# Patient Record
Sex: FEMALE | Race: WHITE | Employment: OTHER | URBAN - METROPOLITAN AREA
[De-identification: names, ages, dates, MRNs, and addresses within clinical notes are randomized per-mention and may not be internally consistent; named-entity substitution may affect disease eponyms.]

---

## 2017-11-14 PROCEDURE — 81001 URINALYSIS AUTO W/SCOPE: CPT | Performed by: FAMILY MEDICINE

## 2018-04-09 PROBLEM — M81.0 OSTEOPOROSIS OF MULTIPLE SITES: Status: ACTIVE | Noted: 2018-04-09

## 2018-04-09 PROBLEM — N39.41 URGENCY INCONTINENCE: Status: ACTIVE | Noted: 2018-04-09

## 2018-04-09 PROBLEM — N95.2 POSTMENOPAUSAL ATROPHIC VAGINITIS: Status: ACTIVE | Noted: 2018-04-09

## 2018-04-26 PROCEDURE — 87086 URINE CULTURE/COLONY COUNT: CPT | Performed by: FAMILY MEDICINE

## 2018-04-26 PROCEDURE — 81001 URINALYSIS AUTO W/SCOPE: CPT | Performed by: FAMILY MEDICINE

## 2018-05-10 PROCEDURE — 83876 ASSAY MYELOPEROXIDASE: CPT | Performed by: INTERNAL MEDICINE

## 2018-05-10 PROCEDURE — 86038 ANTINUCLEAR ANTIBODIES: CPT | Performed by: INTERNAL MEDICINE

## 2018-05-10 PROCEDURE — 86235 NUCLEAR ANTIGEN ANTIBODY: CPT | Performed by: INTERNAL MEDICINE

## 2018-05-10 PROCEDURE — 82085 ASSAY OF ALDOLASE: CPT | Performed by: INTERNAL MEDICINE

## 2018-05-10 PROCEDURE — 86225 DNA ANTIBODY NATIVE: CPT | Performed by: INTERNAL MEDICINE

## 2018-05-10 PROCEDURE — 83516 IMMUNOASSAY NONANTIBODY: CPT | Performed by: INTERNAL MEDICINE

## 2018-05-10 PROCEDURE — 86255 FLUORESCENT ANTIBODY SCREEN: CPT | Performed by: INTERNAL MEDICINE

## 2018-05-22 ENCOUNTER — OFFICE VISIT (OUTPATIENT)
Dept: INTERNAL MEDICINE CLINIC | Facility: CLINIC | Age: 78
End: 2018-05-22

## 2018-05-22 VITALS
BODY MASS INDEX: 27.68 KG/M2 | SYSTOLIC BLOOD PRESSURE: 122 MMHG | WEIGHT: 141 LBS | HEIGHT: 60 IN | DIASTOLIC BLOOD PRESSURE: 71 MMHG | HEART RATE: 89 BPM

## 2018-05-22 DIAGNOSIS — M89.8X9 BONE PAIN: ICD-10-CM

## 2018-05-22 DIAGNOSIS — M94.0 COSTOCHONDRITIS: ICD-10-CM

## 2018-05-22 DIAGNOSIS — R53.82 CHRONIC FATIGUE: Primary | ICD-10-CM

## 2018-05-22 PROCEDURE — G0463 HOSPITAL OUTPT CLINIC VISIT: HCPCS | Performed by: INTERNAL MEDICINE

## 2018-05-22 PROCEDURE — 99204 OFFICE O/P NEW MOD 45 MIN: CPT | Performed by: INTERNAL MEDICINE

## 2018-05-22 NOTE — PROGRESS NOTES
HPI:    Patient ID: Magdaleno Collins is a 76year old female. HPI  She is here today to establish care   She also wants second  Opinion regarding of  her rib pain on the left side that is ongoing since she had her cough started. She denies any trauma.   Acc Musculoskeletal: Negative for arthralgias, back pain, gait problem, joint swelling, myalgias, neck pain and neck stiffness. Pain on chest wall/rib   Allergic/Immunologic: Negative.     Neurological: Negative for dizziness, tremors, speech difficulty, 2010: OTHER SURGICAL HISTORY      Comment: Coronary angiogram   Family History   Problem Relation Age of Onset   • Stroke Father    • Cancer Mother 80     ovarian      Social History: Smoking status: Former Smoker Pulmonary/Chest: Effort normal and breath sounds normal. No accessory muscle usage. No respiratory distress. She has no wheezes. She has no rales. She exhibits no tenderness. Abdominal: Soft.  Bowel sounds are normal. She exhibits no shifting dullness, no

## 2018-05-22 NOTE — PATIENT INSTRUCTIONS
Chronic fatigue  (primary encounter diagnosis)will check her thyroid, b12 level      rib pain /Costochondritis xr done in April no acute pathology - try ibuprofen as needed, will check inflamatory marker , if continues will need further workup   ILD/ IPF

## 2018-06-01 PROCEDURE — 86225 DNA ANTIBODY NATIVE: CPT | Performed by: INTERNAL MEDICINE

## 2018-06-01 PROCEDURE — 36415 COLL VENOUS BLD VENIPUNCTURE: CPT | Performed by: INTERNAL MEDICINE

## 2018-06-01 PROCEDURE — 86038 ANTINUCLEAR ANTIBODIES: CPT | Performed by: INTERNAL MEDICINE

## 2018-06-01 PROCEDURE — 83516 IMMUNOASSAY NONANTIBODY: CPT | Performed by: INTERNAL MEDICINE

## 2018-06-07 ENCOUNTER — TELEPHONE (OUTPATIENT)
Dept: INTERNAL MEDICINE CLINIC | Facility: CLINIC | Age: 78
End: 2018-06-07

## 2018-06-07 NOTE — TELEPHONE ENCOUNTER
patient saw a pulmonary doctor was told she need to see rheumatology for fibro myalgia who do you recommend

## 2018-06-22 ENCOUNTER — TELEPHONE (OUTPATIENT)
Dept: INTERNAL MEDICINE CLINIC | Facility: CLINIC | Age: 78
End: 2018-06-22

## 2018-06-22 NOTE — TELEPHONE ENCOUNTER
Patient calling to let you know she is seeing pulmonary doctor on the 27th at [facility] and will schedule appointment after that.  MICHAELA

## 2018-06-29 ENCOUNTER — OFFICE VISIT (OUTPATIENT)
Dept: INTERNAL MEDICINE CLINIC | Facility: CLINIC | Age: 78
End: 2018-06-29

## 2018-06-29 VITALS
BODY MASS INDEX: 26.24 KG/M2 | HEIGHT: 61 IN | TEMPERATURE: 99 F | DIASTOLIC BLOOD PRESSURE: 80 MMHG | RESPIRATION RATE: 18 BRPM | HEART RATE: 78 BPM | WEIGHT: 139 LBS | SYSTOLIC BLOOD PRESSURE: 135 MMHG

## 2018-06-29 DIAGNOSIS — R07.81 RIB PAIN: Primary | ICD-10-CM

## 2018-06-29 DIAGNOSIS — R76.8 POSITIVE ANA (ANTINUCLEAR ANTIBODY): ICD-10-CM

## 2018-06-29 PROCEDURE — G0463 HOSPITAL OUTPT CLINIC VISIT: HCPCS | Performed by: INTERNAL MEDICINE

## 2018-06-29 PROCEDURE — 99213 OFFICE O/P EST LOW 20 MIN: CPT | Performed by: INTERNAL MEDICINE

## 2018-06-29 RX ORDER — PANTOPRAZOLE SODIUM 40 MG/1
40 TABLET, DELAYED RELEASE ORAL
COMMUNITY
End: 2019-05-06

## 2018-06-29 NOTE — PATIENT INSTRUCTIONS
ib pain  (primary encounter diagnosis) etiology unclear- her recent ct chest didn't show anything, ibuprofen is helping but she doesn't want to take because of her stomach     Positive forrest (antinuclear antibody) - rest of the antibodies  Are negative, + ri

## 2018-06-29 NOTE — PROGRESS NOTES
HPI:    Patient ID: Lit Weber is a 76year old female. HPI      She is here today on her rib pain and follow up on her pulmonary fibrosis    She  states that she  was seen at [facility] for second opinion and they recommended to start new medication.   Sh 267 MG Oral Cap Take 1 tab 3x daily for 1 week, 2 tabs 3x daily for 1 week, 3 tabs 3x daily thereafter Disp:  Rfl:    Pantoprazole Sodium 40 MG Oral Tab EC Take 1 tablet (40 mg total) by mouth daily.  Before meal Disp: 30 tablet Rfl: 11   S-Adenosylmethioni 1/1/1989  Smokeless tobacco: Never Used                      Alcohol use: Yes           0.0 oz/week     Comment: occas       PHYSICAL EXAM:    Physical Exam   Constitutional: She is oriented to person, place, and time.  She appears well-developed and well-n exhibits no edema or tenderness. Lymphadenopathy:     She has no cervical adenopathy. She has no axillary adenopathy. Neurological: She is alert and oriented to person, place, and time. She has normal reflexes. No cranial nerve deficit.  She exhibit

## 2018-11-21 PROBLEM — Z00.00 MEDICARE ANNUAL WELLNESS VISIT, SUBSEQUENT: Status: ACTIVE | Noted: 2018-11-21

## 2018-11-21 PROBLEM — J84.112 IDIOPATHIC PULMONARY FIBROSIS (HCC): Status: ACTIVE | Noted: 2018-11-21

## 2018-11-23 PROCEDURE — 87086 URINE CULTURE/COLONY COUNT: CPT | Performed by: FAMILY MEDICINE

## 2018-11-23 PROCEDURE — 81001 URINALYSIS AUTO W/SCOPE: CPT | Performed by: FAMILY MEDICINE

## 2019-01-24 PROBLEM — IMO0002 DISORDER OF ROTATOR CUFF SYNDROME OF RIGHT SHOULDER AND ALLIED DISORDER: Status: ACTIVE | Noted: 2019-01-24

## 2019-02-28 PROBLEM — M75.121 COMPLETE TEAR OF RIGHT ROTATOR CUFF: Status: ACTIVE | Noted: 2019-02-28

## 2019-05-06 PROBLEM — Z00.00 MEDICARE ANNUAL WELLNESS VISIT, SUBSEQUENT: Status: RESOLVED | Noted: 2018-11-21 | Resolved: 2019-05-06

## 2019-05-06 PROBLEM — M81.0 OSTEOPOROSIS OF MULTIPLE SITES: Status: RESOLVED | Noted: 2018-04-09 | Resolved: 2019-05-06

## 2019-05-06 PROBLEM — IMO0002 DISORDER OF ROTATOR CUFF SYNDROME OF RIGHT SHOULDER AND ALLIED DISORDER: Status: RESOLVED | Noted: 2019-01-24 | Resolved: 2019-05-06

## 2019-09-21 ENCOUNTER — APPOINTMENT (OUTPATIENT)
Dept: CT IMAGING | Facility: HOSPITAL | Age: 79
End: 2019-09-21
Attending: EMERGENCY MEDICINE
Payer: MEDICARE

## 2019-09-21 ENCOUNTER — APPOINTMENT (OUTPATIENT)
Dept: GENERAL RADIOLOGY | Facility: HOSPITAL | Age: 79
End: 2019-09-21
Attending: EMERGENCY MEDICINE
Payer: MEDICARE

## 2019-09-21 ENCOUNTER — HOSPITAL ENCOUNTER (OUTPATIENT)
Age: 79
Discharge: ANOTHER HOSPITAL | End: 2019-09-21
Attending: EMERGENCY MEDICINE

## 2019-09-21 ENCOUNTER — HOSPITAL ENCOUNTER (EMERGENCY)
Facility: HOSPITAL | Age: 79
Discharge: HOME | End: 2019-09-21
Attending: EMERGENCY MEDICINE
Payer: MEDICARE

## 2019-09-21 VITALS
SYSTOLIC BLOOD PRESSURE: 162 MMHG | RESPIRATION RATE: 18 BRPM | WEIGHT: 135 LBS | HEART RATE: 79 BPM | OXYGEN SATURATION: 100 % | BODY MASS INDEX: 26.5 KG/M2 | HEIGHT: 60 IN | TEMPERATURE: 97 F | DIASTOLIC BLOOD PRESSURE: 75 MMHG

## 2019-09-21 VITALS
HEART RATE: 78 BPM | TEMPERATURE: 98 F | RESPIRATION RATE: 16 BRPM | HEIGHT: 60 IN | WEIGHT: 135 LBS | OXYGEN SATURATION: 98 % | BODY MASS INDEX: 26.5 KG/M2 | DIASTOLIC BLOOD PRESSURE: 77 MMHG | SYSTOLIC BLOOD PRESSURE: 158 MMHG
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
## 2019-09-21 DIAGNOSIS — T14.90XA BLUNT TRAUMA: Primary | ICD-10-CM

## 2019-09-21 DIAGNOSIS — S20.219A CONTUSION OF CHEST WALL, UNSPECIFIED LATERALITY, INITIAL ENCOUNTER: ICD-10-CM

## 2019-09-21 LAB
ANION GAP SERPL CALC-SCNC: 8 MMOL/L (ref 0–18)
BASOPHILS # BLD AUTO: 0.03 X10(3) UL (ref 0–0.2)
BASOPHILS NFR BLD AUTO: 0.4 %
BUN BLD-MCNC: 17 MG/DL (ref 7–18)
BUN/CREAT SERPL: 21.5 (ref 10–20)
CALCIUM BLD-MCNC: 9.4 MG/DL (ref 8.5–10.1)
CHLORIDE SERPL-SCNC: 108 MMOL/L (ref 98–112)
CO2 SERPL-SCNC: 23 MMOL/L (ref 21–32)
CREAT BLD-MCNC: 0.79 MG/DL (ref 0.55–1.02)
DEPRECATED RDW RBC AUTO: 41.7 FL (ref 35.1–46.3)
EOSINOPHIL # BLD AUTO: 0.15 X10(3) UL (ref 0–0.7)
EOSINOPHIL NFR BLD AUTO: 1.9 %
ERYTHROCYTE [DISTWIDTH] IN BLOOD BY AUTOMATED COUNT: 12.7 % (ref 11–15)
GLUCOSE BLD-MCNC: 96 MG/DL (ref 70–99)
HCT VFR BLD AUTO: 38.3 % (ref 35–48)
HGB BLD-MCNC: 13 G/DL (ref 12–16)
IMM GRANULOCYTES # BLD AUTO: 0.02 X10(3) UL (ref 0–1)
IMM GRANULOCYTES NFR BLD: 0.3 %
LYMPHOCYTES # BLD AUTO: 1.84 X10(3) UL (ref 1–4)
LYMPHOCYTES NFR BLD AUTO: 23.7 %
MCH RBC QN AUTO: 31 PG (ref 26–34)
MCHC RBC AUTO-ENTMCNC: 33.9 G/DL (ref 31–37)
MCV RBC AUTO: 91.2 FL (ref 80–100)
MONOCYTES # BLD AUTO: 0.51 X10(3) UL (ref 0.1–1)
MONOCYTES NFR BLD AUTO: 6.6 %
NEUTROPHILS # BLD AUTO: 5.2 X10 (3) UL (ref 1.5–7.7)
NEUTROPHILS # BLD AUTO: 5.2 X10(3) UL (ref 1.5–7.7)
NEUTROPHILS NFR BLD AUTO: 67.1 %
OSMOLALITY SERPL CALC.SUM OF ELEC: 289 MOSM/KG (ref 275–295)
PLATELET # BLD AUTO: 164 10(3)UL (ref 150–450)
POTASSIUM SERPL-SCNC: 4.6 MMOL/L (ref 3.5–5.1)
RBC # BLD AUTO: 4.2 X10(6)UL (ref 3.8–5.3)
SODIUM SERPL-SCNC: 139 MMOL/L (ref 136–145)
WBC # BLD AUTO: 7.8 X10(3) UL (ref 4–11)

## 2019-09-21 PROCEDURE — 71260 CT THORAX DX C+: CPT | Performed by: EMERGENCY MEDICINE

## 2019-09-21 PROCEDURE — 99203 OFFICE O/P NEW LOW 30 MIN: CPT

## 2019-09-21 PROCEDURE — 70450 CT HEAD/BRAIN W/O DYE: CPT | Performed by: EMERGENCY MEDICINE

## 2019-09-21 PROCEDURE — 73140 X-RAY EXAM OF FINGER(S): CPT | Performed by: EMERGENCY MEDICINE

## 2019-09-21 PROCEDURE — 80048 BASIC METABOLIC PNL TOTAL CA: CPT | Performed by: EMERGENCY MEDICINE

## 2019-09-21 PROCEDURE — 74177 CT ABD & PELVIS W/CONTRAST: CPT | Performed by: EMERGENCY MEDICINE

## 2019-09-21 PROCEDURE — 99284 EMERGENCY DEPT VISIT MOD MDM: CPT

## 2019-09-21 PROCEDURE — 36415 COLL VENOUS BLD VENIPUNCTURE: CPT

## 2019-09-21 PROCEDURE — 85025 COMPLETE CBC W/AUTO DIFF WBC: CPT | Performed by: EMERGENCY MEDICINE

## 2019-09-21 PROCEDURE — 99202 OFFICE O/P NEW SF 15 MIN: CPT

## 2019-09-21 NOTE — ED PROVIDER NOTES
Patient Seen in: [facility] Emergency Department      History   Patient presents with:  Fall (musculoskeletal, neurologic)    Stated Complaint: fall    HPI    68-year-old female with history of CAD on aspirin, pulmonary fibrosis presents for evalu Comment: occas    Drug use: No             Review of Systems    Positive for stated complaint: fall  Other systems are as noted in HPI. Constitutional and vital signs reviewed. All other systems reviewed and negative except as noted above.     Physica WITH PLATELET    Narrative: The following orders were created for panel order CBC WITH DIFFERENTIAL WITH PLATELET.   Procedure                               Abnormality         Status                     ---------                               --------- atrophy with nonspecific white matter changes most likely     related to chronic microvascular ischemia.            Dictated by (CST): Conrado Coleman MD on 9/21/2019 at 11:45         Approved by (CST): Conrado Coleman MD on 9/21/2019 at 11:48 PANCREAS: No fluid collection or ductal dilatation. ADRENALS: No mass or enlargement. KIDNEYS: No hydronephrosis. Symmetric enhancement. Low-density probable     cysts in the right kidney. GI/MESENTERY: Small hiatal hernia. No obstruction. (CST): Seda Hoang MD on 9/21/2019 at 10:50               XR FINGER(S) (MIN 2 VIEWS), LEFT THUMB (CPT=73140)   Final Result    PROCEDURE: XR FINGER(S) (MIN 2 VIEWS), LEFT THUMB (CPT=73140)         COMPARISON: None.          INDICATIONS: Post fal Normal    Consults: No orders of the defined types were placed in this encounter.       MD Discussions or Sign-Outs: None    Impression:  After review and interpretation of the above emergency department workup, the patient was found to have Fall, initial e

## 2019-09-21 NOTE — ED NOTES
Pt presents post mechanical fall yesterday at 1400. States tripped on curb. Endorses pain in L wrist, L knee, bilateral rib pain under breasts, bridge of nose also impacted d/t glasses. On exam, abrasion to L knee, L hand noted.  Pain on palpation to L/R lo

## 2019-09-21 NOTE — ED INITIAL ASSESSMENT (HPI)
Pt reports mechanical fall yesterday while getting into the bus on a bus trip. States tripped over uneven curb. Reports falling on open hands, c/o pain to left hand and wrist, bilateral ribs, and bridge of nose and left knee. Pt denies LOC.  Pt reports 81mg

## 2019-09-21 NOTE — ED NOTES
Pt up ambulating in room without difficulty. Pt took a tylenol (500mg) from her own supply for continued c/o pain. States her lower rib cage hurts when she coughs or moves.   Respiratory therapist at bedside to instruct pt on how to use incentive spiromet

## 2019-11-25 PROBLEM — N39.41 URGENCY INCONTINENCE: Status: RESOLVED | Noted: 2018-04-09 | Resolved: 2019-11-25

## 2019-11-25 PROBLEM — N95.2 POSTMENOPAUSAL ATROPHIC VAGINITIS: Status: RESOLVED | Noted: 2018-04-09 | Resolved: 2019-11-25

## 2019-11-25 PROBLEM — K21.9 GASTROESOPHAGEAL REFLUX DISEASE WITHOUT ESOPHAGITIS: Status: ACTIVE | Noted: 2019-11-25

## 2020-08-13 ENCOUNTER — HOSPITAL ENCOUNTER (OUTPATIENT)
Facility: CLINIC | Age: 80
Setting detail: OBSERVATION
Discharge: HOME | End: 2020-08-14
Attending: EMERGENCY MEDICINE | Admitting: INTERNAL MEDICINE
Payer: COMMERCIAL

## 2020-08-13 ENCOUNTER — APPOINTMENT (OUTPATIENT)
Dept: CT IMAGING | Facility: CLINIC | Age: 80
End: 2020-08-13
Attending: EMERGENCY MEDICINE
Payer: COMMERCIAL

## 2020-08-13 DIAGNOSIS — K40.30 NON-RECURRENT INGUINAL HERNIA WITH OBSTRUCTION WITHOUT GANGRENE, UNSPECIFIED LATERALITY: ICD-10-CM

## 2020-08-13 LAB
ANION GAP SERPL CALCULATED.3IONS-SCNC: 6 MMOL/L (ref 3–14)
BASOPHILS # BLD AUTO: 0 10E9/L (ref 0–0.2)
BASOPHILS NFR BLD AUTO: 0.3 %
BUN SERPL-MCNC: 9 MG/DL (ref 7–30)
CALCIUM SERPL-MCNC: 8.3 MG/DL (ref 8.5–10.1)
CHLORIDE SERPL-SCNC: 105 MMOL/L (ref 94–109)
CO2 SERPL-SCNC: 26 MMOL/L (ref 20–32)
CREAT SERPL-MCNC: 0.61 MG/DL (ref 0.52–1.04)
DIFFERENTIAL METHOD BLD: ABNORMAL
EOSINOPHIL # BLD AUTO: 0.1 10E9/L (ref 0–0.7)
EOSINOPHIL NFR BLD AUTO: 1.4 %
ERYTHROCYTE [DISTWIDTH] IN BLOOD BY AUTOMATED COUNT: 16.7 % (ref 10–15)
GFR SERPL CREATININE-BSD FRML MDRD: 84 ML/MIN/{1.73_M2}
GLUCOSE SERPL-MCNC: 99 MG/DL (ref 70–99)
HCT VFR BLD AUTO: 37.9 % (ref 35–47)
HGB BLD-MCNC: 12.1 G/DL (ref 11.7–15.7)
IMM GRANULOCYTES # BLD: 0 10E9/L (ref 0–0.4)
IMM GRANULOCYTES NFR BLD: 0.1 %
LYMPHOCYTES # BLD AUTO: 1.8 10E9/L (ref 0.8–5.3)
LYMPHOCYTES NFR BLD AUTO: 24.3 %
MCH RBC QN AUTO: 29.7 PG (ref 26.5–33)
MCHC RBC AUTO-ENTMCNC: 31.9 G/DL (ref 31.5–36.5)
MCV RBC AUTO: 93 FL (ref 78–100)
MONOCYTES # BLD AUTO: 0.5 10E9/L (ref 0–1.3)
MONOCYTES NFR BLD AUTO: 7.2 %
NEUTROPHILS # BLD AUTO: 4.8 10E9/L (ref 1.6–8.3)
NEUTROPHILS NFR BLD AUTO: 66.7 %
NRBC # BLD AUTO: 0 10*3/UL
NRBC BLD AUTO-RTO: 0 /100
PLATELET # BLD AUTO: 212 10E9/L (ref 150–450)
POTASSIUM SERPL-SCNC: 3.9 MMOL/L (ref 3.4–5.3)
RBC # BLD AUTO: 4.08 10E12/L (ref 3.8–5.2)
SODIUM SERPL-SCNC: 137 MMOL/L (ref 133–144)
WBC # BLD AUTO: 7.2 10E9/L (ref 4–11)

## 2020-08-13 PROCEDURE — 74176 CT ABD & PELVIS W/O CONTRAST: CPT

## 2020-08-13 PROCEDURE — 99285 EMERGENCY DEPT VISIT HI MDM: CPT | Mod: 25

## 2020-08-13 PROCEDURE — 85025 COMPLETE CBC W/AUTO DIFF WBC: CPT | Performed by: EMERGENCY MEDICINE

## 2020-08-13 PROCEDURE — U0003 INFECTIOUS AGENT DETECTION BY NUCLEIC ACID (DNA OR RNA); SEVERE ACUTE RESPIRATORY SYNDROME CORONAVIRUS 2 (SARS-COV-2) (CORONAVIRUS DISEASE [COVID-19]), AMPLIFIED PROBE TECHNIQUE, MAKING USE OF HIGH THROUGHPUT TECHNOLOGIES AS DESCRIBED BY CMS-2020-01-R: HCPCS | Performed by: EMERGENCY MEDICINE

## 2020-08-13 PROCEDURE — 80048 BASIC METABOLIC PNL TOTAL CA: CPT | Performed by: EMERGENCY MEDICINE

## 2020-08-13 PROCEDURE — C9803 HOPD COVID-19 SPEC COLLECT: HCPCS

## 2020-08-14 ENCOUNTER — PREP FOR PROCEDURE (OUTPATIENT)
Dept: SURGERY | Facility: CLINIC | Age: 80
End: 2020-08-14

## 2020-08-14 VITALS
DIASTOLIC BLOOD PRESSURE: 60 MMHG | RESPIRATION RATE: 20 BRPM | OXYGEN SATURATION: 96 % | TEMPERATURE: 98.7 F | HEIGHT: 64 IN | WEIGHT: 130.1 LBS | SYSTOLIC BLOOD PRESSURE: 116 MMHG | BODY MASS INDEX: 22.21 KG/M2

## 2020-08-14 DIAGNOSIS — K40.90 RIGHT INGUINAL HERNIA: Primary | ICD-10-CM

## 2020-08-14 DIAGNOSIS — Z11.59 ENCOUNTER FOR SCREENING FOR OTHER VIRAL DISEASES: Primary | ICD-10-CM

## 2020-08-14 LAB
LACTATE BLD-SCNC: 0.8 MMOL/L (ref 0.7–2)
SARS-COV-2 RNA SPEC QL NAA+PROBE: NOT DETECTED
SPECIMEN SOURCE: NORMAL

## 2020-08-14 PROCEDURE — 25000132 ZZH RX MED GY IP 250 OP 250 PS 637: Performed by: INTERNAL MEDICINE

## 2020-08-14 PROCEDURE — G0378 HOSPITAL OBSERVATION PER HR: HCPCS

## 2020-08-14 PROCEDURE — 36415 COLL VENOUS BLD VENIPUNCTURE: CPT | Performed by: INTERNAL MEDICINE

## 2020-08-14 PROCEDURE — 99203 OFFICE O/P NEW LOW 30 MIN: CPT | Performed by: SURGERY

## 2020-08-14 PROCEDURE — 83605 ASSAY OF LACTIC ACID: CPT | Performed by: INTERNAL MEDICINE

## 2020-08-14 PROCEDURE — 99236 HOSP IP/OBS SAME DATE HI 85: CPT | Performed by: INTERNAL MEDICINE

## 2020-08-14 RX ORDER — POLYETHYLENE GLYCOL 3350 17 G/17G
17 POWDER, FOR SOLUTION ORAL DAILY PRN
Status: DISCONTINUED | OUTPATIENT
Start: 2020-08-14 | End: 2020-08-14 | Stop reason: HOSPADM

## 2020-08-14 RX ORDER — AMOXICILLIN 250 MG
1 CAPSULE ORAL 2 TIMES DAILY
Status: DISCONTINUED | OUTPATIENT
Start: 2020-08-14 | End: 2020-08-14 | Stop reason: HOSPADM

## 2020-08-14 RX ORDER — NALOXONE HYDROCHLORIDE 0.4 MG/ML
.1-.4 INJECTION, SOLUTION INTRAMUSCULAR; INTRAVENOUS; SUBCUTANEOUS
Status: DISCONTINUED | OUTPATIENT
Start: 2020-08-14 | End: 2020-08-14 | Stop reason: HOSPADM

## 2020-08-14 RX ORDER — ACETAMINOPHEN 650 MG/1
650 SUPPOSITORY RECTAL EVERY 4 HOURS PRN
Status: DISCONTINUED | OUTPATIENT
Start: 2020-08-14 | End: 2020-08-14 | Stop reason: HOSPADM

## 2020-08-14 RX ORDER — AMOXICILLIN 250 MG
1 CAPSULE ORAL 2 TIMES DAILY PRN
Status: DISCONTINUED | OUTPATIENT
Start: 2020-08-14 | End: 2020-08-14 | Stop reason: HOSPADM

## 2020-08-14 RX ORDER — CEPHALEXIN 500 MG/1
500 CAPSULE ORAL EVERY 12 HOURS SCHEDULED
Status: DISCONTINUED | OUTPATIENT
Start: 2020-08-14 | End: 2020-08-14 | Stop reason: HOSPADM

## 2020-08-14 RX ORDER — ONDANSETRON 2 MG/ML
4 INJECTION INTRAMUSCULAR; INTRAVENOUS EVERY 6 HOURS PRN
Status: DISCONTINUED | OUTPATIENT
Start: 2020-08-14 | End: 2020-08-14 | Stop reason: HOSPADM

## 2020-08-14 RX ORDER — AMOXICILLIN 250 MG
2 CAPSULE ORAL 2 TIMES DAILY
Status: DISCONTINUED | OUTPATIENT
Start: 2020-08-14 | End: 2020-08-14 | Stop reason: HOSPADM

## 2020-08-14 RX ORDER — AMOXICILLIN 250 MG
2 CAPSULE ORAL 2 TIMES DAILY PRN
Status: DISCONTINUED | OUTPATIENT
Start: 2020-08-14 | End: 2020-08-14 | Stop reason: HOSPADM

## 2020-08-14 RX ORDER — NITROFURANTOIN 25; 75 MG/1; MG/1
100 CAPSULE ORAL 2 TIMES DAILY
Qty: 10 CAPSULE | Refills: 0 | Status: SHIPPED | OUTPATIENT
Start: 2020-08-14 | End: 2020-08-19

## 2020-08-14 RX ORDER — ACETAMINOPHEN 325 MG/1
650 TABLET ORAL EVERY 4 HOURS PRN
Status: DISCONTINUED | OUTPATIENT
Start: 2020-08-14 | End: 2020-08-14 | Stop reason: HOSPADM

## 2020-08-14 RX ORDER — ONDANSETRON 4 MG/1
4 TABLET, ORALLY DISINTEGRATING ORAL EVERY 6 HOURS PRN
Status: DISCONTINUED | OUTPATIENT
Start: 2020-08-14 | End: 2020-08-14 | Stop reason: HOSPADM

## 2020-08-14 RX ORDER — LORATADINE 10 MG/1
10 TABLET ORAL DAILY
COMMUNITY

## 2020-08-14 RX ADMIN — CEPHALEXIN 500 MG: 500 CAPSULE ORAL at 08:02

## 2020-08-14 RX ADMIN — DOCUSATE SODIUM AND SENNOSIDES 1 TABLET: 8.6; 5 TABLET ORAL at 10:28

## 2020-08-14 ASSESSMENT — ACTIVITIES OF DAILY LIVING (ADL)
SWALLOWING: 0-->SWALLOWS FOODS/LIQUIDS WITHOUT DIFFICULTY
TOILETING: 0-->INDEPENDENT
RETIRED_COMMUNICATION: 0-->UNDERSTANDS/COMMUNICATES WITHOUT DIFFICULTY
BATHING: 0-->INDEPENDENT
TRANSFERRING: 0-->INDEPENDENT
NUMBER_OF_TIMES_PATIENT_HAS_FALLEN_WITHIN_LAST_SIX_MONTHS: 1
FALL_HISTORY_WITHIN_LAST_SIX_MONTHS: YES
AMBULATION: 0-->INDEPENDENT
RETIRED_EATING: 0-->INDEPENDENT
COGNITION: 0 - NO COGNITION ISSUES REPORTED
DRESS: 0-->INDEPENDENT

## 2020-08-14 ASSESSMENT — MIFFLIN-ST. JEOR: SCORE: 1040.13

## 2020-08-14 ASSESSMENT — ENCOUNTER SYMPTOMS
ABDOMINAL PAIN: 1
FEVER: 0

## 2020-08-14 NOTE — ED NOTES
[facility]  ED Nurse Handoff Report    Arielle Manrique is a 79 year old female   ED Chief complaint: Abdominal Pain  . ED Diagnosis:   Final diagnoses:   Non-recurrent inguinal hernia with obstruction without gangrene, unspecified laterality     Allergies: No Known Allergies    Code Status: Full Code  Activity level - Baseline/Home:  Stand by Assist. Activity Level - Current:   Stand by Assist. Lift room needed: No. Bariatric: No   Needed: No   Isolation: No. Infection: Not Applicable.     Vital Signs:   Vitals:    08/13/20 2233   BP: (!) 148/81   Resp: 18   Temp: 98.2  F (36.8  C)   TempSrc: Oral   SpO2: 98%   Weight: 58.4 kg (128 lb 12 oz)       Cardiac Rhythm:  ,      Pain level: 0-10 Pain Scale: 3  Patient confused: No. Patient Falls Risk: Yes.   Elimination Status: Has voided   Patient Report - Initial Complaint: Pt arrives via EMS with incarcerated hernia and small bowel obstruction. Pt was seen at [facility] [address] in [place] and was transferred here for further care. Pt also has UTI which she was given Rocephin. Pt given fentanyl at 1900, pain currently 3/10. VSS, A&O x4. . Focused Assessment: Gastrointestinal - GI WDL: -WDL except; GI symptoms  GI Signs/Symptoms: abdominal discomfort  Gastrointestinal Comment: Pt dx with incarcerated hernia and small bowel obstruction, denies pain.    Tests Performed:   CT Abdomen Pelvis w/o Contrast   Final Result   IMPRESSION:    1.  Interval reduction of right groin hernia. There is wall thickening of distal and terminal ileum with mesenteric congestion. Given the recent incarcerated hernia, close observation and correlation with lactate necessary to exclude bowel ischemia.   2.  Interval improvement in bowel dilatation.   3.  Diverticulosis.   4.  Cholelithiasis.           . Abnormal Results:   Labs Ordered and Resulted from Time of ED Arrival Up to the Time of Departure from the ED   CBC WITH PLATELETS DIFFERENTIAL - Abnormal; Notable for the  following components:       Result Value    RDW 16.7 (*)     All other components within normal limits   BASIC METABOLIC PANEL - Abnormal; Notable for the following components:    Calcium 8.3 (*)     All other components within normal limits   COVID-19 VIRUS (CORONAVIRUS) BY PCR     .   Treatments provided: See MAR  Family Comments: Pt called POA to update.  OBS brochure/video discussed/provided to patient:  No  ED Medications: Medications - No data to display  Drips infusing:  No  For the majority of the shift, the patient's behavior Green. Interventions performed were NA.    Sepsis treatment initiated: No       ED Nurse Name/Phone Number: Nicole Collette, RN,   11:56 PM  RECEIVING UNIT ED HANDOFF REVIEW    Above ED Nurse Handoff Report was reviewed: Yes  Reviewed by: Yady Haynes RN on August 14, 2020 at 12:37 AM

## 2020-08-14 NOTE — ED TRIAGE NOTES
Pt arrives via EMS with incarcerated hernia and small bowel obstruction. Pt was seen at [facility] [address] in [place] and was transferred here for further care. Pt also has UTI which she was given Rocephin. Pt given fentanyl at 1900, pain currently 3/10. VSS, A&O x4.

## 2020-08-14 NOTE — ED PROVIDER NOTES
History     Chief Complaint:  Abdominal Pain    HPI   Arielle Manrique is a 79 year old female with a history of recurrent UTI, COPD, GERD, and hyperlipidemia who presents via EMS for evaluation of abdominal pain.  Patient reports her pain started on Saturday but then resolved.  However this evening she started have acute onset of severe right lower quadrant pain.  She has never had this pain before.  She did not know she had a hernia.  She went to an outside hospital was found to have an incarcerated hernia.  It sounds like they did not do a reduction but did contact surgery in the hospital here and recommended ER evaluation.  Patient denies any fevers.  She does get frequent UTIs and feels like she is also getting a UTI.  She was given antibiotics.  She reports her pain is much improved and she has no nausea or vomiting either.    CT Abdomen Pelvis W 8/13/2020  Impression:  1. Likely incarcerated right inguinal hernia containing a segment of small bowel, with findings concerning for small bowel obstruction. Correlate clinically.  2. Partially visualized airspace disease in the posterior left lower lobe. Chest imaging is recommended.   3. A 12 mm cystic focus in the anterior pancreatic head. Follow-up MRCP is recommended as an outpatient.   4. Colonic diverticulosis without evidence of diverticulitis.   5. Cholelithiasis without findings to suggest cholecystitis.   6. Lumbar degenerative disease with anterolisthesis at L3-4 and L4-5.  7. Normal appendix.    Laboratory Results 8/13/2020  CBC: AWNL (WBC 7.4, HGB 12.7, )  BMP: AWNL (Creatinine 0.68)   Hepatic Panel: AWNL  CRP inflammation: 0.27    UA with Microscopic: Ketones (A), Nitrite Positive (A), Leukocyte esterase Trace (A), WBC 6-10 (A), Bacteria Many (A), WBC Casts 0-2 (A) o/w WNL     Allergies:  No Known Drug Allergies     Medications:    The patient is not currently taking any prescribed medications.      Past Medical History:     Hyperlipidemia  Benign neoplasm of colon  GERD  Recurrent UTI  Osteoarthritis  Degenerative disc disease   COPD    Past Surgical History:    Lumbar laminectomy, L4-5  Colonoscopy x 3  Tonsillectomy  Adenoidectomy  Hemorrhoidectomy  EGD x 2  Rotator cuff repair, right  Trigger finger, left  Dilation and curettage x 2  Eye surgery     Family History:    Mother - Arthritis, Heart disease, CAD, Hypertension  Sister(s) - Hypertension, Arthritis, Thyroid disease    Social History:  The patient was accompanied to the ED by EMS.  Smoking Status: Former, 1960  Smokeless tobacco: Never Used  Alcohol Use: Yes  Drug Use: No    Review of Systems   Constitutional: Negative for fever.   Gastrointestinal: Positive for abdominal pain.   Genitourinary: Positive for decreased urine volume and urgency.   All other systems reviewed and are negative.      Physical Exam     Patient Vitals for the past 24 hrs:   BP Temp Temp src Heart Rate Resp SpO2 Weight   08/13/20 2233 (!) 148/81 98.2  F (36.8  C) Oral 69 18 98 % 58.4 kg (128 lb 12 oz)     Physical Exam  General: Patient is alert and interactive when I enter the room  Head:  The scalp, face, and head appear normal  Eyes:  Conjunctivae are normal  ENT:    The nose is normal    Pinnae are normal    External acoustic canals are normal  Neck:  Trachea midline  CV:  Pulses are normal   Resp:  No respiratory distress   Abdomen:      Soft, non-tender, no masses palpated, no hernia appreciated, non-distended  Musc:  Normal muscular tone    No major joint effusions    No asymmetric leg swelling  Skin:  No rash or lesions noted  Neuro:  Speech is normal and fluent. Face is symmetric.     Moving all extremities well.   Psych: Awake. Alert.  Normal affect.  Appropriate interactions.    Emergency Department Course   Imaging:  Radiology findings were communicated with the patient and Admitting MD who voiced understanding of the findings.     CT Abdomen Pelvis w/o Contrast  IMPRESSION:   1.   Interval reduction of right groin hernia. There is wall thickening of distal and terminal ileum with mesenteric congestion. Given the recent incarcerated hernia, close observation and correlation with lactate necessary to exclude bowel ischemia.  2.  Interval improvement in bowel dilatation.  3.  Diverticulosis.  4.  Cholelithiasis.  Reading per radiology.      Laboratory:  Laboratory findings were communicated with the patient and Admitting MD who voiced understanding of the findings.    CBC: AWNL (WBC 7.2, HGB 12.1, )  BMP: Calcium 8.3 (L) o/w WNL (Creatinine 0.61)     COVID-19 Virus (Coronavirus) by PCR: Pending.      Emergency Department Course:  Past medical records, nursing notes, and vitals reviewed.  The patient was sent for a CT Abdomen Pelvis w/o Contrast while in the emergency department, results above.    IV was inserted and blood was drawn for laboratory testing, results above.   A nasal swab was obtained for laboratory testing, findings above.      (2235)   I performed an exam of the patient as documented above. History obtained from patient and EMS personnel.     (2337)   I rechecked the patient and discussed results and plan of care.     (2342)   I spoke with Dr. Marin of the Hospitalist service regarding patient's presentation, findings, and plan of care.     Findings and plan explained to the Patient who consents to admission. Discussed the patient with Dr. Marin, who will admit the patient to an obesrvation bed for further monitoring, evaluation, and treatment.      I personally reviewed the laboratory and imaging results with the Patient and answered all related questions prior to admission.     Impression & Plan     Medical Decision Making:  Arielle Manrique is a 78 yo who presents with possible incarcerated right hernia.  When I evaluated the patient she actually appears to not have a mass or hernia that needs reduction on exam.  She also has no abdominal tenderness.  Surgery was already  contacted for this case I consulted with them.  We decided to do a repeat CT scan without contrast.  Blood work was unremarkable.  CT confirmed that she had interval reduction of her an inguinal hernia.  She does have some residual mesenteric congestion but I suspect this resolved as she has no pain and her obstruction has resolved.  Surgery was informed and surgery was canceled.  We will admit to [facility] for continued observation just in her pain does not recur as well as her UTI management.  She is Greg received antibiotics at the outside hospital.  Patient admitted in stable condition    Diagnosis:    ICD-10-CM    1. Non-recurrent inguinal hernia with obstruction without gangrene, unspecified laterality  K40.30      Disposition:  Admitted to an observation bed under the care of Dr. Marin.     Scribe Disclosure:  I, Drake Villa, am serving as a scribe at 10:35 PM on 8/13/2020 to document services personally performed by Isabel Alonso MD based on my observations and the provider's statements to me.    8/13/2020   [facility] EMERGENCY DEPARTMENT       Isabel Alonso MD  08/14/20 0154

## 2020-08-14 NOTE — PHARMACY-ADMISSION MEDICATION HISTORY
Admission medication history interview status for this patient is complete. See [facility] admission navigator for allergy information, prior to admission medications and immunization status.     Medication history interview done via telephone during Covid-19 pandemic, indicate source(s): Patient  Medication history resources (including written lists, pill bottles, clinic record):None  Pharmacy: [facility] in [place]  Changes made to PTA medication list:  Added: Loratadine  Deleted: None  Changed: None    Actions taken by pharmacist (provider contacted, etc):None     Additional medication history information:None    Medication reconciliation/reorder completed by provider prior to medication history?  No   (Y/N)     For patients on insulin therapy: No  (Y/N)      Prior to Admission medications    Medication Sig Last Dose Taking? Auth Provider   loratadine (CLARITIN) 10 MG tablet Take 10 mg by mouth daily Past Week at Unknown time Yes Unknown, Entered By History

## 2020-08-14 NOTE — PROGRESS NOTES
PRIMARY DIAGNOSIS: ACUTE PAIN  OUTPATIENT/OBSERVATION GOALS TO BE MET BEFORE DISCHARGE:  1. Pain Status: Pain free.    2. Return to near baseline physical activity: Yes    3. Cleared for discharge by consultants (if involved): N/A    Discharge Planner Nurse   Safe discharge environment identified: Yes  Barriers to discharge: Yes - Inguinal hernia re-evaluation in the AM.        Entered by: Yady Haynes 08/14/2020 5:18 AM     Please review provider order for any additional goals.   Nurse to notify provider when observation goals have been met and patient is ready for discharge.    End of Shift Summary  For vital signs and complete assessments, please see documentation flowsheets.     Pertinent assessments: Reports minimal abd discomfort, tolerable.   Denies N/V. BS active, +ve flatus.   Major Shift Events: Uneventful.  Treatment Plan: Obs status, pain management.     Discharge Readiness: Medically active  Expected Discharge Date: TBD  Discharge Disposition: Home with Self care  Barriers/Criteria for discharge:

## 2020-08-14 NOTE — H&P
Admitted:     08/13/2020      CHIEF COMPLAINT:  Abdominal pain.      HISTORY OF PRESENT ILLNESS:  Arielle Manrique is an 79-year-old female with past medical history significant for chronic obstructive pulmonary disease, urinary tract infection, hypercholesterolemia, gastroesophageal reflux disease, who presents from outside facility with a complaint of abdominal pain.  The patient had abdominal pain for 3 days.  Today, she started to have nausea, and the abdominal pain got worse.  It is more on the right side of the abdomen towards the pelvis.  Patient was evaluated at outside facility, CT scan was done which showed incarcerated hernia.  He also had urinalysis done which is positive for mild UTI slightly high WBC and positive nitrite.  The patient was given a dose of IV Rocephin and transferred here for further evaluation of the incarcerated hernia.  The patient was transferred here and CT scan of the abdomen pelvis repeated, which showed interval reduction of right groin hernia.  The patient was noted to have diverticulosis and cholelithiasis.  ED physician, Dr. Alonso, contacted on-call surgeon, Dr. Owens, who recommended to admit the patient for observation.      PAST MEDICAL HISTORY:   1.  Chronic obstructive pulmonary disease.   2.  Urinary tract infection.   3.  Hypercholesterolemia.   4.  Gastroesophageal reflux disease.   5.  Degenerative joint disease.   6.  Rupture of extensor tendon of finger.      PAST SURGICAL HISTORY:   1.  Laminectomy.   2.  Colonoscopy.   3.  Hemorrhoidectomy.   4.  EGD.   5.  Rotator cuff repair.   6.  Trigger finger.   7.  Diabetic neuropathy.   8.  Eye surgery.      SOCIAL HISTORY:  The patient lives in [place].  She does not smoke.  She does not use illicit drugs.  She rarely drinks alcohol.      ALLERGIES:  NO KNOWN DRUG ALLERGIES.      REVIEW OF SYSTEMS:  Ten points reviewed.  All are negative except those mentioned in History of Present Illness.      HOME MEDICATIONS:     Prior to Admission Medications   Prescriptions Last Dose Informant Patient Reported? Taking?   loratadine (CLARITIN) 10 MG tablet Past Week at Unknown time  Yes Yes   Sig: Take 10 mg by mouth daily      Facility-Administered Medications: None     PHYSICAL EXAMINATION:   GENERAL:  The patient is awake, alert, oriented, not in distress.   VITAL SIGNS:  Blood pressure 140/80, pulse rate 69, temperature 98.0, oxygen saturation 98% on room air.   HEENT:  Pink, nonicteric.  Extraocular muscle movement intact.   NECK:  Supple, no JVD, no thyromegaly.   CHEST:  Good air entry bilaterally.  No wheezing, crackles or rales.   CARDIOVASCULAR:  S1 and S2 were heard, no gallop or murmur.   ABDOMEN:  Soft, nontender, nondistended.  Positive bowel sounds.  No inguinal hernia noted.   EXTREMITIES:  No edema, cyanosis or clubbing.      DIAGNOSTIC TESTS OF INTEREST:  Electrolytes are normal.  Creatinine 0.6, glucose 99.  CBC normal.  CT abdomen and pelvis done here showed interval reduction of the right groin hernia, diverticulosis, cholelithiasis, interval improvement of bowel dilatation.      ASSESSMENT:  Arielle Manrique is an 79-year-old female with a past medical history of chronic obstructive pulmonary disease, urinary tract infection, hypercholesterolemia, who presented to outside facility with abdominal pain of 3 days' duration associated with nausea and episode of vomiting.  CT showed an incarcerated inguinal hernia of the right groin.  The patient was transferred here.  Repeat CT scan showed interval reduction of the right groin hernia, and she is being admitted to the hospital for overnight observation.   1.  Episode of incarcerated inguinal hernia, now spontaneously reduced.   2.  Urinary tract infection.   3.  Chronic obstructive pulmonary disease, stable.      PLAN:  The patient is being admitted under observation.  She has no abdominal pain.  She passes gas.  No nausea or vomiting.  We will start her on a regular diet,  ambulate the patient in the morning, reevaluate for her right inguinal hernia.  If it remains reduced, and she has no symptoms, she can be discharged.  At this point there is no indication to call Surgery.  ED physician already discussed with on-call surgeon, Dr. Owens.  If pain is recurrent and inguinal hernia also reoccurs, consider involving surgery.      CODE STATUS:  The patient is FULL CODE.         ANIBAL CARDONA MD             D: 2020   T: 2020   MT: DAVID      Name:     JEFF ARECHIGA   MRN:      [id number]        Account:      [id number]   :      1940        Admitted:     2020                   Document: [id number]

## 2020-08-14 NOTE — PLAN OF CARE
ROOM # 525    Living Situation (if not independent, order SW consult): IND  Facility name:  : Elmira Jerez (daughter) [phone]    Activity level at baseline: IND  Activity level on admit: SBA      Patient registered to observation; given Patient Bill of Rights; given the opportunity to ask questions about observation status and their plan of care.  Patient has been oriented to the observation room, bathroom and call light is in place.    Discussed discharge goals and expectations with patient/family.

## 2020-08-14 NOTE — PROGRESS NOTES
Pt to D/C to home.  Pt provided with d/c instructions, including new medications, when medications were last given, and when to take them again.  Pt also informed to f/u with PCP and general surgery in 7 days.  Pt verbalized understanding of all d/c and f/u instructions.  All questions were answered at this time.  Copy of paperwork sent with pt.  Daughter to provide transport.  All personal belongings sent with pt.

## 2020-08-14 NOTE — DISCHARGE SUMMARY
[facility]  Hospitalist Discharge Summary      Date of Admission:  8/13/2020  Date of Discharge:  8/14/2020  Discharging Provider: Venkat Soto DO      Discharge Diagnoses   1.  Incarcerated inguinal hernia.  2.  Urinary tract infection.    Follow-ups Needed After Discharge   Follow-up Appointments     Follow-up and recommended labs and tests       Follow up with primary care provider, Skip Barron, within 7 days for   hospital follow- up.  Follow-up with general surgery within 1 week.           Discharge Disposition   Discharged to home  Condition at discharge: Stable      Hospital Course   Arielle Manrique is an 79-year-old female with past medical history significant for chronic obstructive pulmonary disease, urinary tract infection, hypercholesterolemia, gastroesophageal reflux disease, who presented from outside facility with a complaint of abdominal pain.  The patient had abdominal pain for 3 days.  Apparently she had presented to an outside facility for initial evaluation.  Imaging reportedly showed an incarcerated inguinal hernia.  Patient then presented to our emergency room for further evaluation.  Hernia was reduced in the emergency room.  CT scan showed evidence of reduction.  General surgery was contacted.  Was recommended that the patient be observed for a few hours after her reduction.  Patient has not had recurrence of symptoms.  She was also noted to have a urinary tract infection at outside facility.  Had initially been given a dose of IV ceftriaxone.  Been started on oral cephalexin.  Will treat with Macrobid 100 mg twice a day for the next 5 days.  Follow-up with her primary care physician.  Also follow-up with general surgery in the outpatient setting for consideration of nonurgent surgical repair of her inguinal hernia.    Consultations This Hospital Stay   SURGERY GENERAL IP CONSULT    Code Status   Full Code    Time Spent on this Encounter   I spent 20 minutes with MsCarolyn  Burton and working on discharge on 8/14/2020.       Venkat Soto, DO  [facility]  ______________________________________________________________________    Physical Exam   Vital Signs: Temp: 98.7  F (37.1  C) Temp src: Oral BP: 116/60   Heart Rate: 66 Resp: 20 SpO2: 96 % O2 Device: None (Room air)    Weight: 130 lbs 1.6 oz  Gen:  NAD, A&Ox3.  Eyes:  PERRL, sclera anicteric.  OP:  MMM, no lesions.  Neck:  Supple.  CV:  Regular, no murmurs.  Lung:  CTA b/l, normal effort.  Ab:  +BS, soft.  Skin:  Warm, dry to touch.  No rash.  Ext:  No pitting edema LE b/l.         Primary Care Physician   Skip Barron    Discharge Orders      Reason for your hospital stay    Inguinal hernia     Follow-up and recommended labs and tests     Follow up with primary care provider, Skip Barron, within 7 days for hospital follow- up.  Follow-up with general surgery within 1 week.     Activity    Your activity upon discharge: activity as tolerated     Diet    Follow this diet upon discharge: Regular         Discharge Medications   Current Discharge Medication List      START taking these medications    Details   nitroFURantoin macrocrystal-monohydrate (MACROBID) 100 MG capsule Take 1 capsule (100 mg) by mouth 2 times daily for 5 days  Qty: 10 capsule, Refills: 0    Associated Diagnoses: Non-recurrent inguinal hernia with obstruction without gangrene, unspecified laterality         CONTINUE these medications which have NOT CHANGED    Details   loratadine (CLARITIN) 10 MG tablet Take 10 mg by mouth daily           Allergies   Allergies   Allergen Reactions     Seasonal Allergies Other (See Comments)     Seasonal allergies

## 2020-08-14 NOTE — CONSULTS
Consult Date:  08/14/2020      REASON FOR CONSULTATION:  Incarcerated right inguinal hernia.      HISTORY OF PRESENT ILLNESS:  Ms. Manrique is a healthy and active 79-year-old female who was admitted through the ED for observation yesterday after having an acutely incarcerated right inguinal hernia.  The patient did not have a known hernia prior to this event but states that for some time, she has had some discomfort in the right groin when leaning over to tie her shoes.  Yesterday, she had acute onset of pain in this area, radiating to the epigastrium associated with some nausea.  She was seen at the ER in [place] where a CT scan showed a possible incarcerated bowel-containing hernia.  Per the patient's report, the nurse practitioner was hesitant to reduce this as she was concerned for traumatizing the bowel.  She was therefore sent to the [facility] for more definitive care as there was no General Surgery coverage at this facility last night either.  En route, apparently the hernia reduced as there was no lump in the groin on exam here, and a subsequent CT scan showed no residual hernia contents.  This morning, she feels a lot better.  She ate breakfast without difficulty.  She states that her bowels are working normally as well.      PAST MEDICAL AND SURGICAL HISTORY:  Notable for previous back surgery, tonsillectomy, hemorrhoid surgery, rotator cuff repair and a trigger finger repair as well.  She has had no intraabdominal operations previously.  She does have COPD, degenerative disk disease, osteoarthritis and reflux, as well as hyperlipidemia.      CURRENT MEDICATIONS:  At time of admission, the patient was simply on Claritin.      ALLERGIES:  THE PATIENT HAS NO RECOGNIZED DRUG ALLERGIES.      SOCIAL HISTORY:  The patient is a former smoker.  She quit in the 1960s.  She denies any significant alcohol use.      PHYSICAL EXAMINATION:   VITAL SIGNS:  Ms. Manrique is afebrile.  Temperature is 98.2.  Pulse is 69 with blood  pressure of 117/48.  Respiratory rate 16 with 97% saturations on room air.   GENERAL:  She is alert and appropriate, nontoxic, and comfortable-appearing overall.   HEART:  Sounds are regular.   RESPIRATORY:  Breath sounds are clear.   ABDOMEN:  Flat, soft.  She has no palpable lump in the right or left groin and no tenderness on either site.      LABORATORY EXAMINATIONS:  Notable for a white blood cell count of 7.2 thousand, hemoglobin 12.1.  Electrolytes are normal.  Lactate is 0.8.      IMAGING:  I personally reviewed the patient's CT scan from last evening done in our facility.  There is some thickening in the terminal ileum with some congestion in the mesentery adjacent to this, but there is no protruding hernia in the right groin.  Reports were reviewed from her [facility] imaging earlier yesterday.  Noted an incarcerated right inguinal hernia with a segment of small bowel within the contents.      IMPRESSION AND RECOMMENDATIONS:  This is an 79-year-old female with a new diagnosis of right inguinal hernia, which apparently became acutely incarcerated but spontaneously reduced in the interim.  She feels quite well today.  She tolerated breakfast and has no tenderness or bulge in the right groin at present.  I think because of her recent episode that she should consider surgical intervention, but this is not urgent.  Indeed, as she just had breakfast, it seems best that we defer this for now.  Risks, benefits, alternatives, and expected convalescence were reviewed.  We will work with her in scheduling this as an outpatient in the near future.  Our office will reach out to her to schedule surgery in the near future.      Thank you very much for this consultation.         KALYN GRANDE MD             D: 2020   T: 2020   MT:       Name:     JEFF ARECHIGA   MRN:      [id number]        Account:       [id number]   :      1940           Consult Date:  2020      Document: [id number]        cc: MD Camacho

## 2020-08-15 PROBLEM — M75.121 NONTRAUMATIC COMPLETE TEAR OF RIGHT ROTATOR CUFF: Status: ACTIVE | Noted: 2020-08-15

## 2020-08-15 PROBLEM — M18.12 ARTHRITIS OF CARPOMETACARPAL (CMC) JOINT OF LEFT THUMB: Status: ACTIVE | Noted: 2020-08-15

## 2020-08-15 PROBLEM — IMO0002 DISORDER OF ROTATOR CUFF SYNDROME OF LEFT SHOULDER AND ALLIED DISORDER: Status: ACTIVE | Noted: 2020-08-15

## 2020-08-17 ENCOUNTER — TELEPHONE (OUTPATIENT)
Dept: SURGERY | Facility: CLINIC | Age: 80
End: 2020-08-17

## 2020-08-17 DIAGNOSIS — Z11.59 ENCOUNTER FOR SCREENING FOR OTHER VIRAL DISEASES: ICD-10-CM

## 2020-08-17 PROBLEM — D69.2 PURPURA (HCC): Status: ACTIVE | Noted: 2020-08-17

## 2020-08-17 PROCEDURE — U0003 INFECTIOUS AGENT DETECTION BY NUCLEIC ACID (DNA OR RNA); SEVERE ACUTE RESPIRATORY SYNDROME CORONAVIRUS 2 (SARS-COV-2) (CORONAVIRUS DISEASE [COVID-19]), AMPLIFIED PROBE TECHNIQUE, MAKING USE OF HIGH THROUGHPUT TECHNOLOGIES AS DESCRIBED BY CMS-2020-01-R: HCPCS | Performed by: SURGERY

## 2020-08-17 NOTE — TELEPHONE ENCOUNTER
Type of surgery: OPEN REPAIR RIGHT INGUINAL HERNIA WITH MESH   Location of surgery: [facility]  Date and time of surgery: 8/20/2020 @ 11:50 AM   Surgeon: KALYN LARA MD    Pre-Op Appt Date: 8/13/2020 ER   Post-Op Appt Date: PATIENT TO SCHEDULE     Packet sent out: Yes  Pre-cert/Authorization completed:  Not Applicable  Date: 8/14/2020       OPEN REPAIR RIGHT INGUINAL HERNIA WITH MESH    GENERAL H&P DONE ER 8/13/2020 60 MIN REQ PA ASSIST RMO NMS

## 2020-08-18 LAB
SARS-COV-2 RNA SPEC QL NAA+PROBE: NOT DETECTED
SPECIMEN SOURCE: NORMAL

## 2020-08-20 ENCOUNTER — ANESTHESIA EVENT (OUTPATIENT)
Dept: SURGERY | Facility: CLINIC | Age: 80
End: 2020-08-20
Payer: COMMERCIAL

## 2020-08-20 ENCOUNTER — SURGERY (OUTPATIENT)
Age: 80
End: 2020-08-20
Payer: COMMERCIAL

## 2020-08-20 ENCOUNTER — ANESTHESIA (OUTPATIENT)
Dept: SURGERY | Facility: CLINIC | Age: 80
End: 2020-08-20
Payer: COMMERCIAL

## 2020-08-20 ENCOUNTER — HOSPITAL ENCOUNTER (OUTPATIENT)
Facility: CLINIC | Age: 80
Discharge: HOME | End: 2020-08-20
Attending: SURGERY | Admitting: SURGERY
Payer: COMMERCIAL

## 2020-08-20 ENCOUNTER — APPOINTMENT (OUTPATIENT)
Dept: SURGERY | Facility: PHYSICIAN GROUP | Age: 80
End: 2020-08-20
Payer: COMMERCIAL

## 2020-08-20 VITALS
RESPIRATION RATE: 22 BRPM | DIASTOLIC BLOOD PRESSURE: 68 MMHG | WEIGHT: 129.5 LBS | SYSTOLIC BLOOD PRESSURE: 139 MMHG | HEART RATE: 63 BPM | OXYGEN SATURATION: 98 % | BODY MASS INDEX: 22.11 KG/M2 | TEMPERATURE: 98.3 F | HEIGHT: 64 IN

## 2020-08-20 DIAGNOSIS — K40.90 RIGHT INGUINAL HERNIA: ICD-10-CM

## 2020-08-20 PROCEDURE — 25000132 ZZH RX MED GY IP 250 OP 250 PS 637: Performed by: SURGERY

## 2020-08-20 PROCEDURE — 25800030 ZZH RX IP 258 OP 636: Performed by: ANESTHESIOLOGY

## 2020-08-20 PROCEDURE — 40000306 ZZH STATISTIC PRE PROC ASSESS II: Performed by: SURGERY

## 2020-08-20 PROCEDURE — 25000125 ZZHC RX 250: Performed by: SURGERY

## 2020-08-20 PROCEDURE — 25000128 H RX IP 250 OP 636: Performed by: ANESTHESIOLOGY

## 2020-08-20 PROCEDURE — 37000009 ZZH ANESTHESIA TECHNICAL FEE, EACH ADDTL 15 MIN: Performed by: SURGERY

## 2020-08-20 PROCEDURE — 49505 PRP I/HERN INIT REDUC >5 YR: CPT | Mod: AS | Performed by: PHYSICIAN ASSISTANT

## 2020-08-20 PROCEDURE — 27210794 ZZH OR GENERAL SUPPLY STERILE: Performed by: SURGERY

## 2020-08-20 PROCEDURE — 25000132 ZZH RX MED GY IP 250 OP 250 PS 637: Performed by: ANESTHESIOLOGY

## 2020-08-20 PROCEDURE — 93010 ELECTROCARDIOGRAM REPORT: CPT | Performed by: INTERNAL MEDICINE

## 2020-08-20 PROCEDURE — 49505 PRP I/HERN INIT REDUC >5 YR: CPT | Mod: RT | Performed by: SURGERY

## 2020-08-20 PROCEDURE — 37000008 ZZH ANESTHESIA TECHNICAL FEE, 1ST 30 MIN: Performed by: SURGERY

## 2020-08-20 PROCEDURE — 25000128 H RX IP 250 OP 636: Performed by: NURSE ANESTHETIST, CERTIFIED REGISTERED

## 2020-08-20 PROCEDURE — 36000050 ZZH SURGERY LEVEL 2 1ST 30 MIN: Performed by: SURGERY

## 2020-08-20 PROCEDURE — 36000052 ZZH SURGERY LEVEL 2 EA 15 ADDTL MIN: Performed by: SURGERY

## 2020-08-20 PROCEDURE — 25000128 H RX IP 250 OP 636: Performed by: PHYSICIAN ASSISTANT

## 2020-08-20 PROCEDURE — 71000012 ZZH RECOVERY PHASE 1 LEVEL 1 FIRST HR: Performed by: SURGERY

## 2020-08-20 PROCEDURE — 71000027 ZZH RECOVERY PHASE 2 EACH 15 MINS: Performed by: SURGERY

## 2020-08-20 PROCEDURE — C1781 MESH (IMPLANTABLE): HCPCS | Performed by: SURGERY

## 2020-08-20 PROCEDURE — 25000125 ZZHC RX 250: Performed by: NURSE ANESTHETIST, CERTIFIED REGISTERED

## 2020-08-20 PROCEDURE — 71000013 ZZH RECOVERY PHASE 1 LEVEL 1 EA ADDTL HR: Performed by: SURGERY

## 2020-08-20 DEVICE — MESH ULTRAPRO HERNIA 2.4X4.7" LARGE UHSL: Type: IMPLANTABLE DEVICE | Site: GROIN | Status: FUNCTIONAL

## 2020-08-20 RX ORDER — EPHEDRINE SULFATE 50 MG/ML
INJECTION, SOLUTION INTRAVENOUS PRN
Status: DISCONTINUED | OUTPATIENT
Start: 2020-08-20 | End: 2020-08-20

## 2020-08-20 RX ORDER — TAMSULOSIN HYDROCHLORIDE 0.4 MG/1
0.4 CAPSULE ORAL
Status: DISCONTINUED | OUTPATIENT
Start: 2020-08-20 | End: 2020-08-20 | Stop reason: HOSPADM

## 2020-08-20 RX ORDER — DEXAMETHASONE SODIUM PHOSPHATE 4 MG/ML
4 INJECTION, SOLUTION INTRA-ARTICULAR; INTRALESIONAL; INTRAMUSCULAR; INTRAVENOUS; SOFT TISSUE EVERY 10 MIN PRN
Status: DISCONTINUED | OUTPATIENT
Start: 2020-08-20 | End: 2020-08-20 | Stop reason: HOSPADM

## 2020-08-20 RX ORDER — PROPOFOL 10 MG/ML
INJECTION, EMULSION INTRAVENOUS CONTINUOUS PRN
Status: DISCONTINUED | OUTPATIENT
Start: 2020-08-20 | End: 2020-08-20

## 2020-08-20 RX ORDER — MEPERIDINE HYDROCHLORIDE 25 MG/ML
12.5 INJECTION INTRAMUSCULAR; INTRAVENOUS; SUBCUTANEOUS
Status: DISCONTINUED | OUTPATIENT
Start: 2020-08-20 | End: 2020-08-20 | Stop reason: HOSPADM

## 2020-08-20 RX ORDER — FENTANYL CITRATE 50 UG/ML
INJECTION, SOLUTION INTRAMUSCULAR; INTRAVENOUS PRN
Status: DISCONTINUED | OUTPATIENT
Start: 2020-08-20 | End: 2020-08-20

## 2020-08-20 RX ORDER — CEFAZOLIN SODIUM 1 G/3ML
1 INJECTION, POWDER, FOR SOLUTION INTRAMUSCULAR; INTRAVENOUS SEE ADMIN INSTRUCTIONS
Status: DISCONTINUED | OUTPATIENT
Start: 2020-08-20 | End: 2020-08-20 | Stop reason: HOSPADM

## 2020-08-20 RX ORDER — LIDOCAINE HYDROCHLORIDE 10 MG/ML
INJECTION, SOLUTION INFILTRATION; PERINEURAL PRN
Status: DISCONTINUED | OUTPATIENT
Start: 2020-08-20 | End: 2020-08-20

## 2020-08-20 RX ORDER — GLYCOPYRROLATE 0.2 MG/ML
INJECTION, SOLUTION INTRAMUSCULAR; INTRAVENOUS PRN
Status: DISCONTINUED | OUTPATIENT
Start: 2020-08-20 | End: 2020-08-20

## 2020-08-20 RX ORDER — IBUPROFEN 200 MG
400 TABLET ORAL ONCE
Status: COMPLETED | OUTPATIENT
Start: 2020-08-20 | End: 2020-08-20

## 2020-08-20 RX ORDER — BUPIVACAINE HYDROCHLORIDE 5 MG/ML
INJECTION, SOLUTION EPIDURAL; INTRACAUDAL PRN
Status: DISCONTINUED | OUTPATIENT
Start: 2020-08-20 | End: 2020-08-20 | Stop reason: HOSPADM

## 2020-08-20 RX ORDER — MAGNESIUM HYDROXIDE 1200 MG/15ML
LIQUID ORAL PRN
Status: DISCONTINUED | OUTPATIENT
Start: 2020-08-20 | End: 2020-08-20 | Stop reason: HOSPADM

## 2020-08-20 RX ORDER — FENTANYL CITRATE 50 UG/ML
25-50 INJECTION, SOLUTION INTRAMUSCULAR; INTRAVENOUS
Status: DISCONTINUED | OUTPATIENT
Start: 2020-08-20 | End: 2020-08-20 | Stop reason: HOSPADM

## 2020-08-20 RX ORDER — SODIUM CHLORIDE, SODIUM LACTATE, POTASSIUM CHLORIDE, CALCIUM CHLORIDE 600; 310; 30; 20 MG/100ML; MG/100ML; MG/100ML; MG/100ML
INJECTION, SOLUTION INTRAVENOUS CONTINUOUS
Status: DISCONTINUED | OUTPATIENT
Start: 2020-08-20 | End: 2020-08-20 | Stop reason: HOSPADM

## 2020-08-20 RX ORDER — HYDROMORPHONE HYDROCHLORIDE 1 MG/ML
.3-.5 INJECTION, SOLUTION INTRAMUSCULAR; INTRAVENOUS; SUBCUTANEOUS EVERY 10 MIN PRN
Status: DISCONTINUED | OUTPATIENT
Start: 2020-08-20 | End: 2020-08-20 | Stop reason: HOSPADM

## 2020-08-20 RX ORDER — PROPOFOL 10 MG/ML
INJECTION, EMULSION INTRAVENOUS PRN
Status: DISCONTINUED | OUTPATIENT
Start: 2020-08-20 | End: 2020-08-20

## 2020-08-20 RX ORDER — ONDANSETRON 2 MG/ML
INJECTION INTRAMUSCULAR; INTRAVENOUS PRN
Status: DISCONTINUED | OUTPATIENT
Start: 2020-08-20 | End: 2020-08-20

## 2020-08-20 RX ORDER — ONDANSETRON 4 MG/1
4 TABLET, ORALLY DISINTEGRATING ORAL EVERY 30 MIN PRN
Status: DISCONTINUED | OUTPATIENT
Start: 2020-08-20 | End: 2020-08-20 | Stop reason: HOSPADM

## 2020-08-20 RX ORDER — ONDANSETRON 2 MG/ML
4 INJECTION INTRAMUSCULAR; INTRAVENOUS EVERY 30 MIN PRN
Status: DISCONTINUED | OUTPATIENT
Start: 2020-08-20 | End: 2020-08-20 | Stop reason: HOSPADM

## 2020-08-20 RX ORDER — METOCLOPRAMIDE 10 MG/1
10 TABLET ORAL EVERY 6 HOURS PRN
Status: DISCONTINUED | OUTPATIENT
Start: 2020-08-20 | End: 2020-08-20 | Stop reason: HOSPADM

## 2020-08-20 RX ORDER — DEXAMETHASONE SODIUM PHOSPHATE 4 MG/ML
INJECTION, SOLUTION INTRA-ARTICULAR; INTRALESIONAL; INTRAMUSCULAR; INTRAVENOUS; SOFT TISSUE PRN
Status: DISCONTINUED | OUTPATIENT
Start: 2020-08-20 | End: 2020-08-20

## 2020-08-20 RX ORDER — NALOXONE HYDROCHLORIDE 0.4 MG/ML
.1-.4 INJECTION, SOLUTION INTRAMUSCULAR; INTRAVENOUS; SUBCUTANEOUS
Status: DISCONTINUED | OUTPATIENT
Start: 2020-08-20 | End: 2020-08-20 | Stop reason: HOSPADM

## 2020-08-20 RX ORDER — HYDROCODONE BITARTRATE AND ACETAMINOPHEN 5; 325 MG/1; MG/1
1-2 TABLET ORAL EVERY 4 HOURS PRN
Qty: 10 TABLET | Refills: 0 | Status: SHIPPED | OUTPATIENT
Start: 2020-08-20

## 2020-08-20 RX ORDER — GLYCINE 1.5 G/100ML
SOLUTION IRRIGATION PRN
Status: DISCONTINUED | OUTPATIENT
Start: 2020-08-20 | End: 2020-08-20 | Stop reason: HOSPADM

## 2020-08-20 RX ORDER — HYDRALAZINE HYDROCHLORIDE 20 MG/ML
2.5-5 INJECTION INTRAMUSCULAR; INTRAVENOUS EVERY 10 MIN PRN
Status: DISCONTINUED | OUTPATIENT
Start: 2020-08-20 | End: 2020-08-20 | Stop reason: HOSPADM

## 2020-08-20 RX ORDER — DIMENHYDRINATE 50 MG/ML
25 INJECTION, SOLUTION INTRAMUSCULAR; INTRAVENOUS
Status: DISCONTINUED | OUTPATIENT
Start: 2020-08-20 | End: 2020-08-20 | Stop reason: HOSPADM

## 2020-08-20 RX ORDER — METOCLOPRAMIDE HYDROCHLORIDE 5 MG/ML
10 INJECTION INTRAMUSCULAR; INTRAVENOUS EVERY 6 HOURS PRN
Status: DISCONTINUED | OUTPATIENT
Start: 2020-08-20 | End: 2020-08-20 | Stop reason: HOSPADM

## 2020-08-20 RX ORDER — METOPROLOL TARTRATE 1 MG/ML
1-2 INJECTION, SOLUTION INTRAVENOUS EVERY 5 MIN PRN
Status: DISCONTINUED | OUTPATIENT
Start: 2020-08-20 | End: 2020-08-20 | Stop reason: HOSPADM

## 2020-08-20 RX ORDER — HYDROCODONE BITARTRATE AND ACETAMINOPHEN 5; 325 MG/1; MG/1
1 TABLET ORAL
Status: COMPLETED | OUTPATIENT
Start: 2020-08-20 | End: 2020-08-20

## 2020-08-20 RX ORDER — CEFAZOLIN SODIUM 2 G/100ML
2 INJECTION, SOLUTION INTRAVENOUS
Status: COMPLETED | OUTPATIENT
Start: 2020-08-20 | End: 2020-08-20

## 2020-08-20 RX ADMIN — SODIUM CHLORIDE, POTASSIUM CHLORIDE, SODIUM LACTATE AND CALCIUM CHLORIDE: 600; 310; 30; 20 INJECTION, SOLUTION INTRAVENOUS at 12:33

## 2020-08-20 RX ADMIN — FENTANYL CITRATE 25 MCG: 50 INJECTION, SOLUTION INTRAMUSCULAR; INTRAVENOUS at 13:34

## 2020-08-20 RX ADMIN — GLYCINE 200 ML: 1.5 SOLUTION IRRIGATION at 12:18

## 2020-08-20 RX ADMIN — FENTANYL CITRATE 100 MCG: 50 INJECTION, SOLUTION INTRAMUSCULAR; INTRAVENOUS at 11:42

## 2020-08-20 RX ADMIN — HYDROCODONE BITARTRATE AND ACETAMINOPHEN 1 TABLET: 5; 325 TABLET ORAL at 13:35

## 2020-08-20 RX ADMIN — SODIUM CHLORIDE, POTASSIUM CHLORIDE, SODIUM LACTATE AND CALCIUM CHLORIDE: 600; 310; 30; 20 INJECTION, SOLUTION INTRAVENOUS at 11:05

## 2020-08-20 RX ADMIN — HYDROCODONE BITARTRATE AND ACETAMINOPHEN 50 ML: 500; 5 TABLET ORAL at 12:19

## 2020-08-20 RX ADMIN — DEXAMETHASONE SODIUM PHOSPHATE 4 MG: 4 INJECTION, SOLUTION INTRA-ARTICULAR; INTRALESIONAL; INTRAMUSCULAR; INTRAVENOUS; SOFT TISSUE at 11:42

## 2020-08-20 RX ADMIN — CEFAZOLIN SODIUM 2 G: 2 INJECTION, SOLUTION INTRAVENOUS at 11:45

## 2020-08-20 RX ADMIN — BUPIVACAINE HYDROCHLORIDE 30 ML: 5 INJECTION, SOLUTION EPIDURAL; INTRACAUDAL at 12:22

## 2020-08-20 RX ADMIN — GLYCOPYRROLATE 0.2 MG: 0.2 INJECTION, SOLUTION INTRAMUSCULAR; INTRAVENOUS at 11:42

## 2020-08-20 RX ADMIN — PROPOFOL 75 MCG/KG/MIN: 10 INJECTION, EMULSION INTRAVENOUS at 11:46

## 2020-08-20 RX ADMIN — ONDANSETRON HYDROCHLORIDE 4 MG: 2 INJECTION, SOLUTION INTRAVENOUS at 11:42

## 2020-08-20 RX ADMIN — PROPOFOL 80 MG: 10 INJECTION, EMULSION INTRAVENOUS at 11:42

## 2020-08-20 RX ADMIN — LIDOCAINE HYDROCHLORIDE 50 MG: 10 INJECTION, SOLUTION INFILTRATION; PERINEURAL at 11:42

## 2020-08-20 RX ADMIN — IBUPROFEN 400 MG: 200 TABLET, FILM COATED ORAL at 14:36

## 2020-08-20 RX ADMIN — EPHEDRINE SULFATE 10 MG: 50 INJECTION, SOLUTION INTRAVENOUS at 11:49

## 2020-08-20 ASSESSMENT — COPD QUESTIONNAIRES
COPD: 1
CAT_SEVERITY: MILD

## 2020-08-20 ASSESSMENT — MIFFLIN-ST. JEOR
SCORE: 1032.41
SCORE: 1032.41

## 2020-08-20 NOTE — ANESTHESIA PREPROCEDURE EVALUATION
Anesthesia Pre-Procedure Evaluation    Patient: Arielle Manrique   MRN: [id number] : 1940          Preoperative Diagnosis: Right inguinal hernia [K40.90]    Procedure(s):  RIGHT INGUINAL HENRIA REPAIR WITH MESH    Past Medical History:   Diagnosis Date     COPD (chronic obstructive pulmonary disease) (H)     with URI     Gastroesophageal reflux disease      PONV (postoperative nausea and vomiting)      Past Surgical History:   Procedure Laterality Date     BACK SURGERY      laminectomy lumbar     COLONOSCOPY       GYN SURGERY      D&C     ORTHOPEDIC SURGERY      rotator cuff repair     Anesthesia Evaluation     .             ROS/MED HX    ENT/Pulmonary:     (+)mild COPD, , . .    Neurologic:  - neg neurologic ROS     Cardiovascular:  - neg cardiovascular ROS       METS/Exercise Tolerance:     Hematologic:  - neg hematologic  ROS       Musculoskeletal:   (+)  other musculoskeletal- hernia      GI/Hepatic:     (+) GERD Asymptomatic on medication,       Renal/Genitourinary:  - ROS Renal section negative       Endo:  - neg endo ROS       Psychiatric:  - neg psychiatric ROS       Infectious Disease:  - neg infectious disease ROS       Malignancy:      - no malignancy   Other:    (+) No chance of pregnancy C-spine cleared: N/A, no H/O Chronic Pain,no other significant disability                         Physical Exam  Normal systems: cardiovascular, pulmonary and dental    Airway   Mallampati: II  TM distance: >3 FB  Neck ROM: full    Dental     Cardiovascular       Pulmonary             Lab Results   Component Value Date    WBC 7.2 2020    HGB 12.1 2020    HCT 37.9 2020     2020     2020    POTASSIUM 3.9 2020    CHLORIDE 105 2020    CO2 26 2020    BUN 9 2020    CR 0.61 2020    GLC 99 2020    BHARGAV 8.3 (L) 2020       Preop Vitals  BP Readings from Last 3 Encounters:   20 (!) 142/71   20 116/60    Pulse Readings from Last  "3 Encounters:   08/20/20 61      Resp Readings from Last 3 Encounters:   08/20/20 12   08/14/20 20    SpO2 Readings from Last 3 Encounters:   08/20/20 98%   08/14/20 96%      Temp Readings from Last 1 Encounters:   08/20/20 98.7  F (37.1  C) (Temporal)    Ht Readings from Last 1 Encounters:   08/20/20 1.626 m (5' 4\")      Wt Readings from Last 1 Encounters:   08/20/20 58.7 kg (129 lb 8 oz)    Estimated body mass index is 22.23 kg/m  as calculated from the following:    Height as of this encounter: 1.626 m (5' 4\").    Weight as of this encounter: 58.7 kg (129 lb 8 oz).       Anesthesia Plan      History & Physical Review  History and physical reviewed and following examination; no interval change.    ASA Status:  3 .    NPO Status:  > 8 hours    Plan for General with Propofol and Intravenous induction. Maintenance will be Balanced.    PONV prophylaxis:  Ondansetron (or other 5HT-3) and Dexamethasone or Solumedrol         Postoperative Care  Postoperative pain management:  IV analgesics.      Consents  Anesthetic plan, risks, benefits and alternatives discussed with:  Patient.  Use of blood products discussed: Yes.   Use of blood products discussed with Patient.  Consented to blood products.  .                 Pepe Stauffer MD                    .  "

## 2020-08-20 NOTE — OP NOTE
Procedure Date: 08/20/2020      PREOPERATIVE DIAGNOSIS:  Right inguinal hernia, recent incarceration event.      POSTOPERATIVE DIAGNOSIS:  Right inguinal hernia, recent incarceration event.      PROCEDURE PERFORMED:  Open repair of right inguinal hernia with placement of UltraPro hernia system.      ANESTHESIA:  General plus local.      SURGEON:  Sushant Sahu MD      ASSISTANT:  Lenore Ledbetter PA-C.  The physician assistant was medically necessary for her skills in suturing, cutting the suture, exposure, traction, countertraction and suctioning throughout the operation.      SPECIMENS:  None submitted.      COMPLICATIONS:  None.      INDICATIONS FOR PROCEDURE:  Ms. Marnique is a healthy and active 80-year-old female who was transported to the [facility] a little over a week ago with an acute painful bulge in her right groin, which was found on outpatient imaging to be an incarcerated hernia containing a loop of bowel.  En route to our facility, the hernia spontaneously reduced and at the time of consultation was asymptomatic.  We made plans for outpatient operative repair given this recent event.  Risks of the surgery were discussed with her in detail.  These include infection, bleeding, harm to adjacent structures, chronic pain and hernia recurrence.  The patient verbalized understanding of the above and consented to proceed.      FINDINGS:  The patient had an indirect defect consisting of preperitoneal fat as well as an organized sac.  There was no femoral hernia or direct component.  This was repaired with an UltraPro hernia system.      DESCRIPTION OF PROCEDURE:  With the patient under excellent general anesthesia in the supine position, the right groin was prepped and draped out in the usual sterile surgical fashion.  A timeout was then performed confirming the patient, procedure done as well as drug allergies and site markings.  The patient did receive a dose of Ancef for infection prophylaxis prior to  making our initial incision.  We began by making an approximately 5 cm oblique incision extending from the lateral edge of the pubic tubercle outwards.  Electrocautery dissection was carried out down to and through William's fascia to expose the external oblique fibers.  These fibers were cleared of fat in all directions and a self-retaining cerebellar retractor was placed into the wound.  We infiltrated the inguinal canal with 0.5% Marcaine and opened it sharply with a #15 blade.  These fibers were split down to the level of the external ring with Metzenbaum scissors.  The round ligament and hernia contents were then bluntly dissected free from the inguinal canal using a Kitner and it was divided at its distal most aspect over the site of the pubis.  The ligament itself was quite attenuated and not particularly vascular.  At the internal ring, there was fat bulging through this consistent with an indirect defect.  There was also a portion of organized sac, which was circumferentially dissected free from the lip of the ring and reduced.  The epigastric vessels were identified at the medial edge of the internal ring and elevated with an Army-Streeter retractor.  The preperitoneal space was then dissected radially for several centimeters using 2 dampened baby laparotomy sponges.  The reflection of the peritoneum was swept downwards away from the external iliac vessels.  We interrogated the space medial to the external iliacs and found no obvious femoral hernia present.  We introduced an UltraPro hernia system into the field, soaked it in Irrisept and trimmed the external flange slightly at the medial edge of it.  The internal flange was placed in the preperitoneal space and unrolled as flatly as able.  The external flange was placed between the oblique muscles and anchored along the shelving edge of the inguinal ligament with a running 2-0 PDS.  The superior aspect of the external flange was tacked to the internal obliques  superiorly with interrupted 2-0 PDS.  The wound was soaked in Irrisept and anesthetized with a total of 30 mL of 0.5% plain Marcaine.  We closed the external obliques with a running 3-0 Vicryl and reapproximated William's with interrupted 3-0 Vicryls as well.  Skin was closed with 4-0 Vicryl running subcuticular stitch.  Skin glue was applied atop of the closed wounds.  The patient tolerated the procedure well.  She was extubated and brought to recovery in excellent condition.  All sharps and sponge counts were correct at the conclusion of the case.         KALYN GRANDE MD             D: 2020   T: 2020   MT: MINOO      Name:     JEFF ARECHIGA   MRN:      [id number]        Account:        [id number]   :      1940           Procedure Date: 2020      Document: [id number]       cc: Skip Barron MD

## 2020-08-20 NOTE — ANESTHESIA POSTPROCEDURE EVALUATION
Patient: Arielle Manrique    Procedure(s):  RIGHT INGUINAL HENRIA REPAIR WITH MESH    Diagnosis:Right inguinal hernia [K40.90]  Diagnosis Additional Information: No value filed.    Anesthesia Type:  General    Note:  Anesthesia Post Evaluation    Patient location during evaluation: PACU  Patient participation: Able to fully participate in evaluation  Level of consciousness: awake  Pain management: adequate  Airway patency: patent  Cardiovascular status: acceptable  Respiratory status: acceptable  Hydration status: acceptable  PONV: controlled     Anesthetic complications: None          Last vitals:  Vitals:    08/20/20 1300 08/20/20 1315 08/20/20 1330   BP: (!) 144/80 (!) 144/74 (!) 160/83   Pulse: 69 72 68   Resp: 16 16 26   Temp:      SpO2: 99% 98% 99%         Electronically Signed By: Jeremiah Malagon MD  August 20, 2020  1:40 PM

## 2020-08-20 NOTE — BRIEF OP NOTE
[facility]    Brief Operative Note    Pre-operative diagnosis: Right inguinal hernia [K40.90]  Post-operative diagnosis Right inguinal hernia    Procedure: Procedure(s):  RIGHT INGUINAL HENRIA REPAIR WITH MESH  Surgeon: Surgeon(s) and Role:     * Sushant Grant MD - Primary     * Lenore Ledbetter PA-C - Assisting  Anesthesia: General   Estimated blood loss: Less than 10 ml  Drains: None  Specimens: * No specimens in log *  Findings:   Indirect defect consisting of fat and sac, no direct defect or femoral hernia..  Complications: None.  Implants:   Implant Name Type Inv. Item Serial No.  Lot No. LRB No. Used Action   MESH ULTRAPRO HERNIA 2.4X4.7&quot; LARGE UHSL Mesh MESH ULTRAPRO HERNIA 2.4X4.7&quot; LARGE UHSL  J [id number] Right 1 Implanted

## 2020-08-20 NOTE — DISCHARGE INSTRUCTIONS
HOME CARE FOLLOWING INGUINAL/FEMORAL HERNIA REPAIR  PRACHI Samuels, MYRA Perez, LAURENCE Bolton, AMAYA Millard    DIET:  Start with liquids and gradually resume your regular diet as tolerated.  Drink plenty of fluids.  While taking pain medications, consider use of a stool softener, increase your fiber in your diet, or add a fiber supplement (like Metamucil, Citrucel) to help prevent constipation - a possible side effect of pain medications.    NAUSEA:  If nauseated from the anesthetic/pain meds; rest in bed, get up cautiously with assistance, and drink clear liquids (juice, tea, broth).    ACTIVITY:  Light Activity -- you may immediately be up and about as tolerated.  Walking is encouraged, increase as tolerated.  Driving/Light Work-- when comfortable and off narcotic pain medications.  Strenuous Work/Activity -- limit lifting to 20 pounds for 3 weeks.  Active Sports (running, biking, etc.) -- cautiously resume after 4 weeks.    INCISIONAL CARE:    If you have a dressing in place, keep clean and dry for 48 hours; you may replace the gauze if it becomes soiled.    After 48 hours you may remove the dressing and shower.  Do not submerse incision in water for 1 week.    If you have a Dermabond dressing (a type of skin glue), you may shower immediately.    Sutures will absorb and need not be removed.    If present, leave the steri-strips (white paper tapes) in place for 14 days after surgery.    If present, leave Dermabond glue in place until it wears/flakes off.    Do not apply lotions, creams, or ointments to incisions.    Expect a variable amount of swelling/black and blue discoloration that may involve the penis/scrotum or labia.    Some numbness around the incision is common.    A lump/ridge under the incision is normal and will gradually resolve.    DISCOMFORT:  Local anesthetic placed at surgery should provide relief for 4-8 hours.  Begin taking pain pills before discomfort is severe.  Take the  pain medication with some food, when possible, to minimize side effects.  Intermittent use of ice packs to the hernia repair site may help during the first 1-3 weeks after surgery.  Expect gradual improvement.    Over-the-counter anti-inflammatory medications (i.e. Ibuprofen/Advil/Motrin or Naprosyn/Aleve) may be used per package instructions in addition to or while tapering off the narcotic pain medications to decrease swelling and sensitivity at the repair site.  DO NOT TAKE these Anti-inflammatory medications if your primary physician has advised against doing so, or if you have acid reflux, ulcer, or bleeding disorder, or take blood-thinner medications.  Call your primary physician or the surgery office if you have medication questions.    FOLLOW-UP AFTER SURGERY:  -Our office will contact you approximately 2-3 weeks after surgery to check on your progress and answer any questions you may have.  If you are doing well, you will not need to return for an office appointment.  If any concerns are identified over the phone, we will help you make an appointment to see a provider.    -If you have not received a phone call, have any questions or concerns, or would like to be seen, please call us at [phone].  We are located at: [address], [address]; [place] [address]    -CONTACT US IF THE FOLLOWING DEVELOPS:   1. A fever that is above 101     2. Increased redness, warmth, drainage, bleeding, or swelling.   3. Pain that is not relieved by rest/ice and your prescription.   4.  Increasing pain after 48 hours.   5. Drainage that is thick, cloudy, yellow, green or white.   6. Any other questions or concerns.      FREQUENTLY ASKED QUESTIONS:    Q:  How should my incision look?    A:  Normally your incision will appear slightly swollen with light redness directly along the incision itself as it heals.  It may feel like a bump or ridge as the healing/scarring happens, and over time (3-4 months) this bump or  ridge feeling should slowly go away.  In general, clear or pink watery drainage can be normal at first as your incision heals, but should decrease over time.    Q:  How do I know if my incision is infected?  A:  Look at your incision for signs of infection, like redness around the incision spreading to surrounding skin, or drainage of cloudy or foul-smelling drainage.  If you feel warm, check your temperature to see if you are running a fever.    **If any of these things occur, please notify the nurse at our office.  We may need you to come into the office for an incision check.      Q:  How do I take care of my incision?  A:  If you have a dressing in place - Starting the day after surgery, replace the dressing 1-2 times a day until there is no further drainage from the incision.  At that time, a dressing is no longer needed.  Try to minimize tape on the skin if irritation is occurring at the tape sites.  If you have significant irritation from tape on the skin, please call the office to discuss other method of dressing your incision.    Small pieces of tape called  steri-strips  may be present directly overlying your incision; these may be removed 10 days after surgery unless otherwise specified by your surgeon.  If these tapes start to loosen at the ends, you may trim them back until they fall off or are removed.    A:  If you had  Dermabond  tissue glue used as a dressing (this causes your incision to look shiny with a clear covering over it) - This type of dressing wears off with time and does not require more dressings over the top unless it is draining around the glue as it wears off.  Do not apply ointments or lotions over the incisions until the glue has completely worn off.    Q:  There is a piece of tape or a sticky  lead  still on my skin.  Can I remove this?  A:  Sometimes the sticky  leads  used for monitoring during surgery or for evaluation in the emergency department are not all removed while you  are in the hospital.  These sometimes have a tab or metal dot on them.  You can easily remove these on your own, like taking off a band-aid.  If there is a gel substance under the  lead , simply wipe/clean it off with a washcloth or paper towel.      Q:  What can I do to minimize constipation (very hard stools, or lack of stools)?  A:  Stay well hydrated.  Increase your dietary fiber intake or take a fiber supplement -with plenty of water.  Walk around frequently.  You may consider an over-the-counter stool-softener.  Your Pharmacist can assist you with choosing one that is stocked at your pharmacy.  Constipation is also one of the most common side effects of pain medication.  If you are using pain medication, be pro-active and try to PREVENT problems with constipation by taking the steps above BEFORE constipation becomes a problem.    Q:  What do I do if I need more pain medications?  A:  Call the office to receive refills.  Be aware that certain pain meds cannot be called into a pharmacy and actually require a paper prescription.  A change may be made in your pain med as you progress thru your recovery period or if you have side effects to certain meds.    --Pain meds are NOT refilled after 5pm on weekdays, and NOT AT ALL on the weekends, so please look ahead to prevent problems.    Q:  Why am I having a hard time sleeping now that I am at home?  A:  Many medications you receive while you are in the hospital can impact your sleep for a number of days after your surgery/hospitalization.  Decreased level of activity and naps during the day may also make sleeping at night difficult.  Try to minimize day-time naps, and get up frequently during the day to walk around your home during your recovery time.  Sleep aides may be of some help, but are not recommended for long-term use.      Q:  I am having some back discomfort.  What should I do?  A:  This may be related to certain positioning that was required for your  surgery, extended periods of time in bed, or other changes in your overall activity level.  You may try ice, heat, acetaminophen, or ibuprofen to treat this temporarily.  Note that many pain medications have acetaminophen in them and would state this on the prescription bottle.  Be sure not to exceed the maximum of 4000mg per day of acetaminophen.     **If the pain you are having does not resolve, is severe, or is a flare of back pain you have had on other occasions prior to surgery, please contact your primary physician for further recommendations or for an appointment to be examined at their office.    Q:  Why am I having headaches?  A:  Headaches can be caused by many things:  caffeine withdrawal, use of pain meds, dehydration, high blood pressure, lack of sleep, over-activity/exhaustion, flare-up of usual migraine headaches.  If you feel this is related to muscle tension (a band-like feeling around the head, or a pressure at the low-back of the head) you may try ice or heat to this area.  You may need to drink more fluids (try electrolyte drink like Gatorade), rest, or take your usual migraine medications.   **If your headaches do not resolve, worsen, are accompanied by other symptoms, or if your blood pressure is high, please call your primary physician for recommendation and/or examination.    Q:  I am unable to urinate.  What do I do?  A:  A small percentage of people can have difficulty urinating initially after surgery.  This includes being able to urinate only a very small amount at a time and feeling discomfort or pressure in the very low abdomen.  This is called  urinary retention , and is actually an urgent situation.  Proceed to your nearest Emergency department for evaluation (not an Urgent Care Center).  Sometimes the bladder does not work correctly after certain medications you receive during surgery, or related to certain procedures.  You may need to have a catheter placed until your bladder  recovers.  When planning to go to an Emergency department, it may help to call the ER to let them know you are coming in for this problem after a surgery.  This may help you get in quicker to be evaluated.  **If you have symptoms of a urinary tract infection, please contact your primary physician for the proper evaluation and treatment.      If you have other questions, please call the office Monday thru Friday between 8am and 5pm to discuss with the nurse or physician assistant.  #[phone]    There is a surgeon ON CALL on weekday evenings and over the weekend in case of urgent need only, and may be contacted at the same number.    If you are having an emergency, call 911 or proceed to your nearest emergency department.    GENERAL ANESTHESIA OR SEDATION ADULT DISCHARGE INSTRUCTIONS   SPECIAL PRECAUTIONS FOR 24 HOURS AFTER SURGERY    IT IS NOT UNUSUAL TO FEEL LIGHT-HEADED OR FAINT, UP TO 24 HOURS AFTER SURGERY OR WHILE TAKING PAIN MEDICATION.  IF YOU HAVE THESE SYMPTOMS; SIT FOR A FEW MINUTES BEFORE STANDING AND HAVE SOMEONE ASSIST YOU WHEN YOU GET UP TO WALK OR USE THE BATHROOM.    YOU SHOULD REST AND RELAX FOR THE NEXT 24 HOURS AND YOU MUST MAKE ARRANGEMENTS TO HAVE SOMEONE STAY WITH YOU FOR AT LEAST 24 HOURS AFTER YOUR DISCHARGE.  AVOID HAZARDOUS AND STRENUOUS ACTIVITIES.  DO NOT MAKE IMPORTANT DECISIONS FOR 24 HOURS.    DO NOT DRIVE ANY VEHICLE OR OPERATE MECHANICAL EQUIPMENT FOR 24 HOURS FOLLOWING THE END OF YOUR SURGERY.  EVEN THOUGH YOU MAY FEEL NORMAL, YOUR REACTIONS MAY BE AFFECTED BY THE MEDICATION YOU HAVE RECEIVED.    DO NOT DRINK ALCOHOLIC BEVERAGES FOR 24 HOURS FOLLOWING YOUR SURGERY.    DRINK CLEAR LIQUIDS (APPLE JUICE, GINGER ALE, 7-UP, BROTH, ETC.).  PROGRESS TO YOUR REGULAR DIET AS YOU FEEL ABLE.    YOU MAY HAVE A DRY MOUTH, A SORE THROAT, MUSCLES ACHES OR TROUBLE SLEEPING.  THESE SHOULD GO AWAY AFTER 24 HOURS.    CALL YOUR DOCTOR FOR ANY OF THE FOLLOWING:  SIGNS OF INFECTION (FEVER, GROWING  TENDERNESS AT THE SURGERY SITE, A LARGE AMOUNT OF DRAINAGE OR BLEEDING, SEVERE PAIN, FOUL-SMELLING DRAINAGE, REDNESS OR SWELLING.    IT HAS BEEN OVER 8 TO 10 HOURS SINCE SURGERY AND YOU ARE STILL NOT ABLE TO URINATE (PASS WATER).

## 2020-08-20 NOTE — ANESTHESIA CARE TRANSFER NOTE
Patient: Arielle Manrique    Procedure(s):  RIGHT INGUINAL HENRIA REPAIR WITH MESH    Diagnosis: Right inguinal hernia [K40.90]  Diagnosis Additional Information: No value filed.    Anesthesia Type:   General     Note:  Airway :Face Mask  Patient transferred to:PACU  Comments: Patient with spontaneous respirations and adequate tidal volumes. Patient awake and responsive. LMA removed in OR to 6 L facemask. To PACU ventilating well. VSS. Report given.Handoff Report: Identifed the Patient, Identified the Reponsible Provider, Reviewed the pertinent medical history, Discussed the surgical course, Reviewed Intra-OP anesthesia mangement and issues during anesthesia, Set expectations for post-procedure period and Allowed opportunity for questions and acknowledgement of understanding      Vitals: (Last set prior to Anesthesia Care Transfer)    CRNA VITALS  8/20/2020 1205 - 8/20/2020 1241      8/20/2020             Pulse:  81    SpO2:  100 %    Resp Rate (observed):  10                Electronically Signed By: NAVARRO Cespedes CRNA  August 20, 2020  12:41 PM

## 2020-08-21 LAB — INTERPRETATION ECG - MUSE: NORMAL

## 2020-09-04 ENCOUNTER — TELEPHONE (OUTPATIENT)
Dept: SURGERY | Facility: CLINIC | Age: 80
End: 2020-09-04

## 2020-09-04 NOTE — TELEPHONE ENCOUNTER
[facility]  Post op call note     Arielle Manrique was called for an update regarding her recovery.  She underwent an open right inguinal hernia by Dr. Sahu on August / 20 / 2020.  Today she tells me she is doing well and denies any complaints. She is eating a normal diet and her bowels are regular. She states her wounds are healing well.    Patient was instructed to slowly and gradually resume all normal activities. The patient states all of her questions were answered.  She understands our discussion.  She agrees to follow up as needed or to call our office with any concerns.    Lenore Ledbetter PA-C      Please route or send letter to:  Primary Care Provider (PCP)

## 2020-11-27 PROBLEM — D69.2 PURPURA (HCC): Status: RESOLVED | Noted: 2020-08-17 | Resolved: 2020-11-27

## 2020-11-27 PROBLEM — J84.112 IPF (IDIOPATHIC PULMONARY FIBROSIS) (HCC): Status: ACTIVE | Noted: 2018-07-04

## 2021-05-28 PROBLEM — M81.0 AGE-RELATED OSTEOPOROSIS WITHOUT CURRENT PATHOLOGICAL FRACTURE: Status: ACTIVE | Noted: 2021-05-28

## 2021-05-28 PROBLEM — R76.8 RHEUMATOID FACTOR POSITIVE: Status: ACTIVE | Noted: 2021-05-28

## (undated) DEVICE — SU PROLENE 2-0 CT-2 30" 8411H

## (undated) DEVICE — NDL 22GA 1.5"

## (undated) DEVICE — SU VICRYL 4-0 PS-2 18" UND J496H

## (undated) DEVICE — SU VICRYL 3-0 SH 27" UND J416H

## (undated) DEVICE — ESU GROUND PAD ADULT W/CORD E7507

## (undated) DEVICE — SPONGE LAP 4X18" X8415

## (undated) DEVICE — PREP CHLORAPREP 26ML TINTED ORANGE  260815

## (undated) DEVICE — DRAPE LAP W/ARMBOARD 29410

## (undated) DEVICE — SPONGE KITTNER 30-101

## (undated) DEVICE — LINEN FULL SHEET 5511

## (undated) DEVICE — SU VICRYL 3-0 TIE 12X18" J904T

## (undated) DEVICE — ADH SKIN CLOSURE PREMIERPRO EXOFIN MICOR HV 0.5ML 3471

## (undated) DEVICE — CLEANSER JET LAVAGE IRRISEPT 0.05% CHG IRRISEPT45USA

## (undated) DEVICE — PACK MINOR CUSTOM RIDGES SBA32RMRMA

## (undated) DEVICE — BAG CLEAR TRASH 1.3M 39X33" P4040C

## (undated) DEVICE — LINEN TOWEL PACK X5 5464

## (undated) DEVICE — SU PDS II 2-0 CT-2 27"  Z333H

## (undated) DEVICE — LINEN HALF SHEET 5512

## (undated) DEVICE — LINEN TOWEL PACK X10 5473

## (undated) DEVICE — GLOVE PROTEXIS BLUE W/NEU-THERA 8.0  2D73EB80

## (undated) DEVICE — GLOVE PROTEXIS POWDER FREE 7.5 ORTHOPEDIC 2D73ET75

## (undated) RX ORDER — PROPOFOL 10 MG/ML
INJECTION, EMULSION INTRAVENOUS
Status: DISPENSED
Start: 2020-08-20

## (undated) RX ORDER — HYDROCODONE BITARTRATE AND ACETAMINOPHEN 5; 325 MG/1; MG/1
TABLET ORAL
Status: DISPENSED
Start: 2020-08-20

## (undated) RX ORDER — DEXAMETHASONE SODIUM PHOSPHATE 4 MG/ML
INJECTION, SOLUTION INTRA-ARTICULAR; INTRALESIONAL; INTRAMUSCULAR; INTRAVENOUS; SOFT TISSUE
Status: DISPENSED
Start: 2020-08-20

## (undated) RX ORDER — EPHEDRINE SULFATE 50 MG/ML
INJECTION, SOLUTION INTRAMUSCULAR; INTRAVENOUS; SUBCUTANEOUS
Status: DISPENSED
Start: 2020-08-20

## (undated) RX ORDER — BUPIVACAINE HYDROCHLORIDE 5 MG/ML
INJECTION, SOLUTION EPIDURAL; INTRACAUDAL
Status: DISPENSED
Start: 2020-08-20

## (undated) RX ORDER — FENTANYL CITRATE 50 UG/ML
INJECTION, SOLUTION INTRAMUSCULAR; INTRAVENOUS
Status: DISPENSED
Start: 2020-08-20

## (undated) RX ORDER — CEFAZOLIN SODIUM 2 G/100ML
INJECTION, SOLUTION INTRAVENOUS
Status: DISPENSED
Start: 2020-08-20

## (undated) RX ORDER — LIDOCAINE HYDROCHLORIDE 10 MG/ML
INJECTION, SOLUTION EPIDURAL; INFILTRATION; INTRACAUDAL; PERINEURAL
Status: DISPENSED
Start: 2020-08-20

## (undated) RX ORDER — IBUPROFEN 200 MG
TABLET ORAL
Status: DISPENSED
Start: 2020-08-20

## (undated) RX ORDER — ONDANSETRON 2 MG/ML
INJECTION INTRAMUSCULAR; INTRAVENOUS
Status: DISPENSED
Start: 2020-08-20

## (undated) NOTE — ED AVS SNAPSHOT
Hugh Vasquez   MRN: [id number]    Department:  [facility] Emergency Department   Date of Visit:  9/21/2019           Disclosure     Insurance plans vary and the physician(s) referred by the ER may not be covered by your plan.  Please contact you within the next three months to obtain basic health screening including reassessment of your blood pressure.     IF THERE IS ANY CHANGE OR WORSENING OF YOUR CONDITION, CALL YOUR PRIMARY CARE PHYSICIAN AT ONCE OR RETURN IMMEDIATELY TO THE EMERGENCY DEPARTMEN